# Patient Record
Sex: FEMALE | Race: WHITE | NOT HISPANIC OR LATINO | Employment: OTHER | URBAN - METROPOLITAN AREA
[De-identification: names, ages, dates, MRNs, and addresses within clinical notes are randomized per-mention and may not be internally consistent; named-entity substitution may affect disease eponyms.]

---

## 2023-01-23 ENCOUNTER — APPOINTMENT (EMERGENCY)
Dept: RADIOLOGY | Facility: HOSPITAL | Age: 68
End: 2023-01-23
Attending: EMERGENCY MEDICINE
Payer: MEDICARE

## 2023-01-23 ENCOUNTER — HOSPITAL ENCOUNTER (OUTPATIENT)
Facility: HOSPITAL | Age: 68
Setting detail: OBSERVATION
Discharge: HOME | End: 2023-01-24
Attending: EMERGENCY MEDICINE | Admitting: INTERNAL MEDICINE
Payer: MEDICARE

## 2023-01-23 DIAGNOSIS — N23 RENAL COLIC ON LEFT SIDE: Primary | ICD-10-CM

## 2023-01-23 PROBLEM — N13.9 OBSTRUCTIVE UROPATHY: Status: ACTIVE | Noted: 2023-01-23

## 2023-01-23 PROBLEM — R03.0 ELEVATED BLOOD PRESSURE READING WITHOUT DIAGNOSIS OF HYPERTENSION: Status: ACTIVE | Noted: 2023-01-23

## 2023-01-23 LAB
ALBUMIN SERPL-MCNC: 4.2 G/DL (ref 3.4–5)
ALP SERPL-CCNC: 82 IU/L (ref 35–126)
ALT SERPL-CCNC: 22 IU/L (ref 11–54)
ANION GAP SERPL CALC-SCNC: 8 MEQ/L (ref 3–15)
AST SERPL-CCNC: 21 IU/L (ref 15–41)
ATRIAL RATE: 94
BACTERIA URNS QL MICRO: ABNORMAL /HPF
BASOPHILS # BLD: 0.07 K/UL (ref 0.01–0.1)
BASOPHILS NFR BLD: 0.6 %
BILIRUB SERPL-MCNC: 0.7 MG/DL (ref 0.3–1.2)
BILIRUB UR QL STRIP.AUTO: NEGATIVE MG/DL
BUN SERPL-MCNC: 27 MG/DL (ref 8–20)
CALCIUM SERPL-MCNC: 9.2 MG/DL (ref 8.9–10.3)
CHLORIDE SERPL-SCNC: 104 MEQ/L (ref 98–109)
CLARITY UR REFRACT.AUTO: CLEAR
CO2 SERPL-SCNC: 27 MEQ/L (ref 22–32)
COLOR UR AUTO: COLORLESS
CREAT SERPL-MCNC: 0.7 MG/DL (ref 0.6–1.1)
DIFFERENTIAL METHOD BLD: ABNORMAL
EOSINOPHIL # BLD: 0.17 K/UL (ref 0.04–0.36)
EOSINOPHIL NFR BLD: 1.4 %
ERYTHROCYTE [DISTWIDTH] IN BLOOD BY AUTOMATED COUNT: 13.2 % (ref 11.7–14.4)
GFR SERPL CREATININE-BSD FRML MDRD: >60 ML/MIN/1.73M*2
GLUCOSE SERPL-MCNC: 183 MG/DL (ref 70–99)
GLUCOSE UR STRIP.AUTO-MCNC: ABNORMAL MG/DL
HCT VFR BLDCO AUTO: 42.1 % (ref 35–45)
HGB BLD-MCNC: 13.6 G/DL (ref 11.8–15.7)
HGB UR QL STRIP.AUTO: 3
HYALINE CASTS #/AREA URNS LPF: ABNORMAL /LPF
IMM GRANULOCYTES # BLD AUTO: 0.07 K/UL (ref 0–0.08)
IMM GRANULOCYTES NFR BLD AUTO: 0.6 %
KETONES UR STRIP.AUTO-MCNC: ABNORMAL MG/DL
LEUKOCYTE ESTERASE UR QL STRIP.AUTO: 1
LIPASE SERPL-CCNC: 29 U/L (ref 20–51)
LYMPHOCYTES # BLD: 2.11 K/UL (ref 1.2–3.5)
LYMPHOCYTES NFR BLD: 17.7 %
MCH RBC QN AUTO: 29.4 PG (ref 28–33.2)
MCHC RBC AUTO-ENTMCNC: 32.3 G/DL (ref 32.2–35.5)
MCV RBC AUTO: 90.9 FL (ref 83–98)
MONOCYTES # BLD: 0.52 K/UL (ref 0.28–0.8)
MONOCYTES NFR BLD: 4.4 %
MUCOUS THREADS URNS QL MICRO: ABNORMAL /LPF
NEUTROPHILS # BLD: 8.99 K/UL (ref 1.7–7)
NEUTS SEG NFR BLD: 75.3 %
NITRITE UR QL STRIP.AUTO: NEGATIVE
NRBC BLD-RTO: 0 %
P AXIS: 35
PDW BLD AUTO: 8.9 FL (ref 9.4–12.3)
PH UR STRIP.AUTO: 7 [PH]
PLATELET # BLD AUTO: 261 K/UL (ref 150–369)
POTASSIUM SERPL-SCNC: 3.4 MEQ/L (ref 3.6–5.1)
PR INTERVAL: 158
PROT SERPL-MCNC: 7.3 G/DL (ref 6–8.2)
PROT UR QL STRIP.AUTO: ABNORMAL
QRS DURATION: 86
QT INTERVAL: 372
QTC CALCULATION(BAZETT): 465
R AXIS: -1
RBC # BLD AUTO: 4.63 M/UL (ref 3.93–5.22)
RBC #/AREA URNS HPF: ABNORMAL /HPF
SARS-COV-2 RNA RESP QL NAA+PROBE: NEGATIVE
SODIUM SERPL-SCNC: 139 MEQ/L (ref 136–144)
SP GR UR REFRACT.AUTO: 1.02
SQUAMOUS URNS QL MICRO: ABNORMAL /HPF
T WAVE AXIS: 14
UROBILINOGEN UR STRIP-ACNC: 0.2 EU/DL
VENTRICULAR RATE: 94
WBC # BLD AUTO: 11.93 K/UL (ref 3.8–10.5)
WBC #/AREA URNS HPF: ABNORMAL /HPF

## 2023-01-23 PROCEDURE — 25800000 HC PHARMACY IV SOLUTIONS: Performed by: EMERGENCY MEDICINE

## 2023-01-23 PROCEDURE — 1123F ACP DISCUSS/DSCN MKR DOCD: CPT | Performed by: INTERNAL MEDICINE

## 2023-01-23 PROCEDURE — 36415 COLL VENOUS BLD VENIPUNCTURE: CPT | Performed by: EMERGENCY MEDICINE

## 2023-01-23 PROCEDURE — 81001 URINALYSIS AUTO W/SCOPE: CPT | Performed by: EMERGENCY MEDICINE

## 2023-01-23 PROCEDURE — 85025 COMPLETE CBC W/AUTO DIFF WBC: CPT | Performed by: EMERGENCY MEDICINE

## 2023-01-23 PROCEDURE — 74176 CT ABD & PELVIS W/O CONTRAST: CPT | Mod: ME

## 2023-01-23 PROCEDURE — 63600000 HC DRUGS/DETAIL CODE

## 2023-01-23 PROCEDURE — 93005 ELECTROCARDIOGRAM TRACING: CPT | Performed by: PHYSICIAN ASSISTANT

## 2023-01-23 PROCEDURE — 63600000 HC DRUGS/DETAIL CODE: Performed by: PHYSICIAN ASSISTANT

## 2023-01-23 PROCEDURE — 63700000 HC SELF-ADMINISTRABLE DRUG: Performed by: NURSE PRACTITIONER

## 2023-01-23 PROCEDURE — 96361 HYDRATE IV INFUSION ADD-ON: CPT

## 2023-01-23 PROCEDURE — 96376 TX/PRO/DX INJ SAME DRUG ADON: CPT | Mod: 59 | Performed by: INTERNAL MEDICINE

## 2023-01-23 PROCEDURE — 99223 1ST HOSP IP/OBS HIGH 75: CPT | Mod: FS | Performed by: INTERNAL MEDICINE

## 2023-01-23 PROCEDURE — G0378 HOSPITAL OBSERVATION PER HR: HCPCS

## 2023-01-23 PROCEDURE — 63600000 HC DRUGS/DETAIL CODE: Performed by: EMERGENCY MEDICINE

## 2023-01-23 PROCEDURE — 25800000 HC PHARMACY IV SOLUTIONS: Performed by: PHYSICIAN ASSISTANT

## 2023-01-23 PROCEDURE — 63700000 HC SELF-ADMINISTRABLE DRUG: Performed by: INTERNAL MEDICINE

## 2023-01-23 PROCEDURE — 96376 TX/PRO/DX INJ SAME DRUG ADON: CPT

## 2023-01-23 PROCEDURE — 99284 EMERGENCY DEPT VISIT MOD MDM: CPT | Mod: 25

## 2023-01-23 PROCEDURE — 80053 COMPREHEN METABOLIC PANEL: CPT | Performed by: EMERGENCY MEDICINE

## 2023-01-23 PROCEDURE — U0003 INFECTIOUS AGENT DETECTION BY NUCLEIC ACID (DNA OR RNA); SEVERE ACUTE RESPIRATORY SYNDROME CORONAVIRUS 2 (SARS-COV-2) (CORONAVIRUS DISEASE [COVID-19]), AMPLIFIED PROBE TECHNIQUE, MAKING USE OF HIGH THROUGHPUT TECHNOLOGIES AS DESCRIBED BY CMS-2020-01-R: HCPCS | Performed by: EMERGENCY MEDICINE

## 2023-01-23 PROCEDURE — 83690 ASSAY OF LIPASE: CPT | Performed by: EMERGENCY MEDICINE

## 2023-01-23 PROCEDURE — 96375 TX/PRO/DX INJ NEW DRUG ADDON: CPT

## 2023-01-23 RX ORDER — SODIUM CHLORIDE 9 MG/ML
INJECTION, SOLUTION INTRAVENOUS CONTINUOUS
Status: DISCONTINUED | OUTPATIENT
Start: 2023-01-23 | End: 2023-01-24 | Stop reason: HOSPADM

## 2023-01-23 RX ORDER — ONDANSETRON HYDROCHLORIDE 2 MG/ML
4 INJECTION, SOLUTION INTRAVENOUS EVERY 6 HOURS PRN
Status: DISCONTINUED | OUTPATIENT
Start: 2023-01-23 | End: 2023-01-24 | Stop reason: HOSPADM

## 2023-01-23 RX ORDER — ACETAMINOPHEN 325 MG/1
650 TABLET ORAL EVERY 6 HOURS PRN
Status: DISCONTINUED | OUTPATIENT
Start: 2023-01-23 | End: 2023-01-24 | Stop reason: HOSPADM

## 2023-01-23 RX ORDER — MORPHINE SULFATE 2 MG/ML
1-2 INJECTION, SOLUTION INTRAMUSCULAR; INTRAVENOUS
Status: DISCONTINUED | OUTPATIENT
Start: 2023-01-23 | End: 2023-01-24 | Stop reason: HOSPADM

## 2023-01-23 RX ORDER — KETOROLAC TROMETHAMINE 15 MG/ML
15 INJECTION, SOLUTION INTRAMUSCULAR; INTRAVENOUS ONCE
Status: COMPLETED | OUTPATIENT
Start: 2023-01-23 | End: 2023-01-23

## 2023-01-23 RX ORDER — ONDANSETRON HYDROCHLORIDE 2 MG/ML
INJECTION, SOLUTION INTRAVENOUS
Status: COMPLETED
Start: 2023-01-23 | End: 2023-01-23

## 2023-01-23 RX ORDER — KETOROLAC TROMETHAMINE 15 MG/ML
15 INJECTION, SOLUTION INTRAMUSCULAR; INTRAVENOUS EVERY 6 HOURS PRN
Status: DISCONTINUED | OUTPATIENT
Start: 2023-01-23 | End: 2023-01-24 | Stop reason: HOSPADM

## 2023-01-23 RX ORDER — TAMSULOSIN HYDROCHLORIDE 0.4 MG/1
0.4 CAPSULE ORAL NIGHTLY
Qty: 7 CAPSULE | Refills: 0 | Status: SHIPPED | OUTPATIENT
Start: 2023-01-23 | End: 2023-01-23 | Stop reason: SDUPTHER

## 2023-01-23 RX ORDER — ONDANSETRON 4 MG/1
4 TABLET, FILM COATED ORAL
Qty: 12 TABLET | Refills: 0 | Status: SHIPPED | OUTPATIENT
Start: 2023-01-23 | End: 2023-01-24 | Stop reason: SDUPTHER

## 2023-01-23 RX ORDER — KETOROLAC TROMETHAMINE 10 MG/1
10 TABLET, FILM COATED ORAL EVERY 6 HOURS PRN
Qty: 20 TABLET | Refills: 0 | Status: SHIPPED | OUTPATIENT
Start: 2023-01-23 | End: 2023-01-24 | Stop reason: SDUPTHER

## 2023-01-23 RX ORDER — DEXTROSE 40 %
15-30 GEL (GRAM) ORAL AS NEEDED
Status: DISCONTINUED | OUTPATIENT
Start: 2023-01-23 | End: 2023-01-24 | Stop reason: HOSPADM

## 2023-01-23 RX ORDER — MORPHINE SULFATE 15 MG/1
15 TABLET ORAL EVERY 4 HOURS PRN
Qty: 30 TABLET | Refills: 0 | Status: SHIPPED | OUTPATIENT
Start: 2023-01-23 | End: 2023-01-24 | Stop reason: HOSPADM

## 2023-01-23 RX ORDER — TAMSULOSIN HYDROCHLORIDE 0.4 MG/1
0.4 CAPSULE ORAL DAILY
Status: DISCONTINUED | OUTPATIENT
Start: 2023-01-23 | End: 2023-01-24 | Stop reason: HOSPADM

## 2023-01-23 RX ORDER — TAMSULOSIN HYDROCHLORIDE 0.4 MG/1
0.4 CAPSULE ORAL NIGHTLY
Qty: 7 CAPSULE | Refills: 0 | Status: SHIPPED | OUTPATIENT
Start: 2023-01-23 | End: 2023-01-24 | Stop reason: SDUPTHER

## 2023-01-23 RX ORDER — MORPHINE SULFATE 15 MG/1
15 TABLET ORAL EVERY 4 HOURS PRN
Qty: 30 TABLET | Refills: 0 | Status: SHIPPED | OUTPATIENT
Start: 2023-01-23 | End: 2023-01-23 | Stop reason: SDUPTHER

## 2023-01-23 RX ORDER — IBUPROFEN 200 MG
16-32 TABLET ORAL AS NEEDED
Status: DISCONTINUED | OUTPATIENT
Start: 2023-01-23 | End: 2023-01-24 | Stop reason: HOSPADM

## 2023-01-23 RX ORDER — KETOROLAC TROMETHAMINE 10 MG/1
10 TABLET, FILM COATED ORAL EVERY 6 HOURS PRN
Qty: 20 TABLET | Refills: 0 | Status: SHIPPED | OUTPATIENT
Start: 2023-01-23 | End: 2023-01-23 | Stop reason: SDUPTHER

## 2023-01-23 RX ORDER — MORPHINE SULFATE 4 MG/ML
4 INJECTION, SOLUTION INTRAMUSCULAR; INTRAVENOUS ONCE
Status: COMPLETED | OUTPATIENT
Start: 2023-01-23 | End: 2023-01-23

## 2023-01-23 RX ORDER — ONDANSETRON 4 MG/1
4 TABLET, FILM COATED ORAL
Qty: 12 TABLET | Refills: 0 | Status: SHIPPED | OUTPATIENT
Start: 2023-01-23 | End: 2023-01-23 | Stop reason: SDUPTHER

## 2023-01-23 RX ORDER — MORPHINE SULFATE 2 MG/ML
2 INJECTION, SOLUTION INTRAMUSCULAR; INTRAVENOUS ONCE
Status: COMPLETED | OUTPATIENT
Start: 2023-01-23 | End: 2023-01-23

## 2023-01-23 RX ORDER — DEXTROSE 50 % IN WATER (D50W) INTRAVENOUS SYRINGE
25 AS NEEDED
Status: DISCONTINUED | OUTPATIENT
Start: 2023-01-23 | End: 2023-01-24 | Stop reason: HOSPADM

## 2023-01-23 RX ORDER — ONDANSETRON HYDROCHLORIDE 2 MG/ML
4 INJECTION, SOLUTION INTRAVENOUS ONCE
Status: COMPLETED | OUTPATIENT
Start: 2023-01-23 | End: 2023-01-23

## 2023-01-23 RX ADMIN — MORPHINE SULFATE 2 MG: 2 INJECTION, SOLUTION INTRAMUSCULAR; INTRAVENOUS at 10:49

## 2023-01-23 RX ADMIN — MORPHINE SULFATE 2 MG: 2 INJECTION, SOLUTION INTRAMUSCULAR; INTRAVENOUS at 05:19

## 2023-01-23 RX ADMIN — MORPHINE SULFATE 2 MG: 2 INJECTION, SOLUTION INTRAMUSCULAR; INTRAVENOUS at 07:04

## 2023-01-23 RX ADMIN — KETOROLAC TROMETHAMINE 15 MG: 15 INJECTION, SOLUTION INTRAMUSCULAR; INTRAVENOUS at 07:03

## 2023-01-23 RX ADMIN — ONDANSETRON HYDROCHLORIDE 4 MG: 2 INJECTION, SOLUTION INTRAVENOUS at 15:00

## 2023-01-23 RX ADMIN — KETOROLAC TROMETHAMINE 15 MG: 15 INJECTION, SOLUTION INTRAMUSCULAR; INTRAVENOUS at 05:19

## 2023-01-23 RX ADMIN — KETOROLAC TROMETHAMINE 15 MG: 15 INJECTION, SOLUTION INTRAMUSCULAR; INTRAVENOUS at 12:50

## 2023-01-23 RX ADMIN — ONDANSETRON HYDROCHLORIDE 4 MG: 2 SOLUTION INTRAMUSCULAR; INTRAVENOUS at 15:00

## 2023-01-23 RX ADMIN — ONDANSETRON HYDROCHLORIDE 4 MG: 2 SOLUTION INTRAMUSCULAR; INTRAVENOUS at 05:19

## 2023-01-23 RX ADMIN — SODIUM CHLORIDE: 9 INJECTION, SOLUTION INTRAVENOUS at 10:48

## 2023-01-23 RX ADMIN — MORPHINE SULFATE 4 MG: 4 INJECTION, SOLUTION INTRAMUSCULAR; INTRAVENOUS at 06:16

## 2023-01-23 RX ADMIN — ACETAMINOPHEN 325MG 650 MG: 325 TABLET ORAL at 23:15

## 2023-01-23 RX ADMIN — SODIUM CHLORIDE 1000 ML: 9 INJECTION, SOLUTION INTRAVENOUS at 05:19

## 2023-01-23 RX ADMIN — TAMSULOSIN HYDROCHLORIDE 0.4 MG: 0.4 CAPSULE ORAL at 18:13

## 2023-01-23 RX ADMIN — MORPHINE SULFATE 2 MG: 2 INJECTION, SOLUTION INTRAMUSCULAR; INTRAVENOUS at 14:29

## 2023-01-23 ASSESSMENT — ENCOUNTER SYMPTOMS
NAUSEA: 1
VOMITING: 1
FLANK PAIN: 1
ABDOMINAL PAIN: 1

## 2023-01-23 NOTE — ASSESSMENT & PLAN NOTE
- Patient hypertensive in ED  - Likely at least in part due to pain and anxiety however patient has not seen a medical provider in unclear period of time so unsure if she has hypertension at baseline  - Pain management as needed  - Monitor trends, recommended primary care follow-up to establish preventative care

## 2023-01-23 NOTE — H&P
"   Hospital Medicine Service -  History & Physical        CHIEF COMPLAINT   Left flank pain with nausea and vomiting     HISTORY OF PRESENT ILLNESS      Milagros Hoffman is a 67 y.o. female with no known past medical history who presents with above complaint.  Patient states that she awoke overnight with left-sided flank pain radiating to her left upper quadrant with associated nausea and vomiting.  Denies chest pain, shortness of breath, fevers.  Denies dysuria, frequency, hematuria.  Denies any history of similar.  Patient states she is unsure when she last saw a medical provider.  She does not take any prescription medications.  Patient states she does not get medical care \"because I do not want to.\"  Patient is unaware of any prior history of kidney stones.    In ED patient hypertensive.    ED work-up significant for CT abdomen/pelvis with a left obstructive 5 mm kidney stone.  WBC 11.9.  UA with too numerous to count RBCs and 3+ blood, no current evidence for infection.    In ED patient received IVF hydration, antiemetic, pain medicine.    At time of exam patient sitting upright in bed appears uncomfortable but otherwise in no acute distress.  Her  Gordon is her surrogate decision-maker and she is a full code.    PAST MEDICAL AND SURGICAL HISTORY      History reviewed. No pertinent past medical history.    History reviewed. No pertinent surgical history.    PCP: Pt States, No Pcp    MEDICATIONS      Prior to Admission medications    Medication Sig Start Date End Date Taking? Authorizing Provider   ketorolac (TORADOL) 10 mg tablet Take 1 tablet (10 mg total) by mouth every 6 (six) hours as needed for moderate pain for up to 5 days. 1/23/23 1/28/23 Yes TRAAN Gaspar MD   morphine (MSIR) 15 mg immediate release tablet Take 1 tablet (15 mg total) by mouth every 4 (four) hours as needed for severe pain for up to 10 days. 1/23/23 2/2/23 Yes TARAN Gaspar MD   ondansetron (ZOFRAN) 4 mg tablet Take 1 tablet " (4 mg total) by mouth every 6 (six) hours for 7 days. 1/23/23 1/30/23 Yes TARAN Gaspar MD   tamsulosin (FLOMAX) 0.4 mg capsule Take 1 capsule (0.4 mg total) by mouth nightly. After dinner 1/23/23  Yes TARAN Gaspar MD   ketorolac (TORADOL) 10 mg tablet Take 1 tablet (10 mg total) by mouth every 6 (six) hours as needed for moderate pain for up to 5 days. 1/23/23 1/23/23  TARAN Gaspar MD   morphine (MSIR) 15 mg immediate release tablet Take 1 tablet (15 mg total) by mouth every 4 (four) hours as needed for severe pain for up to 10 days. 1/23/23 1/23/23  TARAN Gaspar MD   ondansetron (ZOFRAN) 4 mg tablet Take 1 tablet (4 mg total) by mouth every 6 (six) hours for 7 days. 1/23/23 1/23/23  TARAN Gaspar MD   tamsulosin (FLOMAX) 0.4 mg capsule Take 1 capsule (0.4 mg total) by mouth nightly. After dinner 1/23/23 1/23/23  TARAN Gaspar MD       ALLERGIES      Patient has no known allergies.    FAMILY HISTORY      History reviewed. No pertinent family history.    SOCIAL HISTORY      Social History     Socioeconomic History   • Marital status:      Spouse name: None   • Number of children: None   • Years of education: None   • Highest education level: None   Tobacco Use   • Smoking status: Never   • Smokeless tobacco: Never   Substance and Sexual Activity   • Alcohol use: Never   • Drug use: Never     Social Determinants of Health     Food Insecurity: No Food Insecurity   • Worried About Running Out of Food in the Last Year: Never true   • Ran Out of Food in the Last Year: Never true       REVIEW OF SYSTEMS      All other systems reviewed and negative except as noted in HPI    PHYSICAL EXAMINATION      Temp:  [37.1 °C (98.7 °F)] 37.1 °C (98.7 °F)  Heart Rate:  [79-86] 86  Resp:  [17-22] 17  BP: (166-186)/() 166/77  Body mass index is 28.01 kg/m².    Physical Exam  Constitutional:       Appearance: She is not toxic-appearing or diaphoretic.      Comments: Appears uncomfortable   HENT:       "Head: Normocephalic and atraumatic.   Eyes:      General: No scleral icterus.     Extraocular Movements: Extraocular movements intact.      Conjunctiva/sclera: Conjunctivae normal.      Pupils: Pupils are equal, round, and reactive to light.   Cardiovascular:      Rate and Rhythm: Normal rate and regular rhythm.      Heart sounds: Normal heart sounds.   Pulmonary:      Effort: Pulmonary effort is normal. No respiratory distress.      Breath sounds: Normal breath sounds. No stridor. No wheezing, rhonchi or rales.   Abdominal:      General: Bowel sounds are normal. There is no distension.      Palpations: Abdomen is soft.      Tenderness: There is no right CVA tenderness, left CVA tenderness or guarding.      Comments: Mild TTP of LUQ abdomen   Musculoskeletal:         General: No swelling. Normal range of motion.      Cervical back: Normal range of motion.   Skin:     General: Skin is warm and dry.      Capillary Refill: Capillary refill takes less than 2 seconds.   Neurological:      Mental Status: She is alert and oriented to person, place, and time.   Psychiatric:         Mood and Affect: Mood normal.         Behavior: Behavior normal.         LABS / IMAGING / EKG        Labs  Labs reviewed . See A/P for plan regarding pertinent labs.     Imaging  I have independently reviewed the pertinent imaging from the last 24 hrs. and Significant findings include:     CT ABDOMEN / PELVIS:  \"IMPRESSION:     1.  Left-sided obstructive uropathy.   2.  Additional chronic and incidental findings as described above.\"    No results found for: COVID19    ECG/Telemetry  N/A    ASSESSMENT AND PLAN           * Obstructive uropathy  Assessment & Plan  - Admit [facility]  - Patient presented to ED for acute onset of left-sided flank pain that began overnight with nausea and vomiting  - CT abdomen/pelvis consistent with left-sided obstructive uropathy due to 5 mm proximal left ureteral calculus, mild left sided perinephritic stranding  - " Mild leukocytosis 11.9 without fever  - UA does not appear infected at this time  - Aggressive IVF hydration  - N.p.o. pending urology evaluation  - Pain control as needed    Elevated blood pressure reading without diagnosis of hypertension  Assessment & Plan  - Patient hypertensive in ED  - Likely at least in part due to pain and anxiety however patient has not seen a medical provider in unclear period of time so unsure if she has hypertension at baseline  - Pain management as needed  - Monitor trends       VTE Assessment: Padua VTE Score: 0  VTE Prophylaxis: Current anticoagulants:    •None      Code Status: Full Code  Palliative Care Screening Score: 1   Discussed advanced care planning.   Estimated Discharge Date: 1/25/2023  Disposition Planning: Discharge home pending hospital course     SHAWN Leung  1/23/2023

## 2023-01-23 NOTE — CONSULTS
"Urology Consultation    Milagros Hoffman is a 67 y.o. female who was admitted for Renal colic [N23]  Renal colic on left side [N23]. Patient seen for 5 mm left proximal ureteral calculus, renal colic.  Patient seen at request of Dr. Mulligan.     Patient is a 67 y.o. female with no known past medical history who presented to the ED with left flank pain with associated nausea and vomiting. CT a/p showed a 5 mm proximal ureteral calculus with associated mild left hydroureteronephrosis. WBC 11.93, creatinine 0.7. UA bland with +1 leuks, 0-3 WBCs, rare bacteria, TNTC RBCs. Afebrile and hemodynamically stable. Patient resting comfortably in bed on exam. Reports vomiting multiple times overnight but nothing since being admitted. Denies nausea, fever, chills at the time of exam. Denies a history of kidney stones, UTIs, hematuria, and urinary retention. Urinating without difficulty and without pain. Reports pain improving since receiving pain medication, approximately a \"3 out of 10\" per patient report.       Subjective   Left sided flank pain     Severity: mild   Onset quality: sudden   Duration: 1 day   Timing: constant   Progression: improving   Chronicity: acute   Relieved by: pain medication, hydration   Worsened by: ureteral calculus   Associated symptoms: nausea, vomiting       Medical History: History reviewed. No pertinent past medical history.    Surgical History: History reviewed. No pertinent surgical history.    Social History: never smoker, denies ETOH and drug use     Family History: History reviewed. No pertinent family history.    Allergies: Patient has no known allergies.    Current Facility-Administered Medications   Medication Dose Route Frequency Provider Last Rate Last Admin   • glucose chewable tablet 16-32 g of dextrose  16-32 g of dextrose oral PRN Isela Lucio PA C        Or   • dextrose 40 % oral gel 15-30 g of dextrose  15-30 g of dextrose oral PRN Isela Lucio PA C        Or "   • glucagon (GLUCAGEN) injection 1 mg  1 mg intramuscular PRN Isela Lucio PA C        Or   • dextrose 50 % in water (D50) injection 12.5 g  25 mL intravenous PRN Isela Lucio PA C       • ketorolac (TORADOL) injection 15 mg  15 mg intravenous q6h PRN Isela Lucio PA C   15 mg at 01/23/23 1250   • morphine injection 1-2 mg  1-2 mg intravenous q3h PRN Isela Lucio PA C   2 mg at 01/23/23 1429   • ondansetron (ZOFRAN) injection 4 mg  4 mg intravenous q6h PRN Isela Lucio PA C   4 mg at 01/23/23 1500   • sodium chloride 0.9 % infusion   intravenous Continuous Isela Lucio PA C 100 mL/hr at 01/23/23 1048 New Bag at 01/23/23 1048     Review of Systems  Pertinent items are noted in HPI.        Labs  CBC Results       01/23/23     0515    WBC 11.93    RBC 4.63    HGB 13.6    HCT 42.1    MCV 90.9    MCH 29.4    MCHC 32.3          BMP Results       01/23/23     0515        K 3.4    Cl 104    CO2 27    Glucose 183    BUN 27    Creatinine 0.7    Calcium 9.2    Anion Gap 8    EGFR >60.0         Comment for K at 0515 on 01/23/23: Results obtained on plasma. Plasma Potassium values may be up to 0.4 mEQ/L less than serum values. The differences may be greater for patients with high platelet or white cell counts.      PT/PTT Results    No lab values to display.     UA Results       01/23/23     0707    Color Colorless    Clarity Clear    Glucose Trace    Bilirubin Negative    Ketones Trace    Sp Grav 1.017    Blood +3    Ph 7.0    Protein Trace    Urobilinogen 0.2    Nitrite Negative    Leuk Est +1    WBC 0 TO 3    RBC Too Numerous To Count    Bacteria Rare         Comment for Ketones at 0707 on 01/23/23: Free sulfhydryl drugs such as Mesna, Capoten, and Acetylcysteine (Mucomyst) may cause false positive ketonuria.    Comment for Blood at 0707 on 01/23/23: The sensitivity of the occult blood test is equivalent to approximately 4 intact RBC/HPF.    Comment for  "Protein at 0707 on 01/23/23: Trace False Positive Protein can be seen with alkaline or highly buffered urines or urine with high specific gravity. Suggest clinical correlation.      Lactate Results    No lab values to display.       No results found for: PSA    Imaging  CT a/p w/o CNTR 1/23/23    Heart normal in size.  No pleural or pericardial effusion.  Mild hypoventilatory   changes noted at the lung bases.  Degenerative changes of the spine, hips and   pubic symphysis noted.  No calcified gallstones or gross pericholecystic   inflammatory changes.  No biliary ductal dilatation.  A 3 cm cyst is noted   within the right lobe of the liver.  Unenhanced pancreas, spleen, adrenal glands   and right kidney grossly unremarkable.  Mild left-sided hydroureteronephrosis   noted to the level of a obstructing proximal left ureteral calculus measuring 5   mm in diameter.  There is mild left-sided perinephric stranding.  No additional   urinary tract calculi noted.  There is no right-sided hydroureteronephrosis.   Urinary bladder grossly unremarkable.  No gross pelvic masses noted.  No   ascites.  No abdominal aortic aneurysm.  No abdominal lymphadenopathy.  No bowel   obstruction or pneumoperitoneum.  No focal inflammatory changes of bowel noted.   No pelvic lymphadenopathy.    Impression:     IMPRESSION:     1.  Left-sided obstructive uropathy.   2.  Additional chronic and incidental findings as described above.       Physicial Exam  Visit Vitals  /69 (BP Location: Right upper arm, Patient Position: Lying)   Pulse 99   Temp 37.1 °C (98.7 °F) (Oral)   Resp 18   Ht 1.499 m (4' 11\")   Wt 62.9 kg (138 lb 10.7 oz)   SpO2 100%   BMI 28.01 kg/m²     General appearance: alert, appears stated age, cooperative and no distress  Head: normocephalic, without obvious abnormality, atraumatic  Neck: supple, symmetrical, trachea midline  Back: mild left CVA tenderness to palpation, no right sided tenderness  Chest wall: breathing non " labored  Abdomen: soft, non distended, mild LUQ tenderness to with movement none with palpation   Female genitalia: normal external genitalia, no erythema, no discharge  Rectal: deferred  Skin: temperature normal        Assessment   - 5 mm left ureteral calculus with associated mild left hydroureteronephrosis  - UA without evidence of infection       Plan    - Monitor labs/UOP  - Pain medication prn  - Supportive care per primary service  - Afebrile and hemodynamically stable. No need for emergent surgical intervention at this time. Continue with pain management and expulsive therapy. Pain medication, IV hydration, Flomax, strain all urine. NPO at midnight. Can continue expulsive therapy at home if pain able to be controlled.   - Outpatient follow up with Urology   - Following     SHAWN Hamilton  1/23/2023  4:36 PM

## 2023-01-23 NOTE — PLAN OF CARE
Problem: Adult Inpatient Plan of Care  Goal: Plan of Care Review  Outcome: Progressing  Flowsheets (Taken 1/23/2023 5799)  Progress: no change  Plan of Care Reviewed With: patient  Outcome Summary: Pt recieved from ED, aaox3. Reports mild pain of 3 in L flank area. IVF infusing as ordered.  at bedside. Call bell in reach, safety precautions in place.   Plan of Care Review  Plan of Care Reviewed With: patient  Progress: no change  Outcome Summary: Pt recieved from ED, aaox3. Reports mild pain of 3 in L flank area. IVF infusing as ordered.  at bedside. Call bell in reach, safety precautions in place.

## 2023-01-23 NOTE — ASSESSMENT & PLAN NOTE
Left Hydro ureter nephrosis with proximal 5 mm left sided calculus noted  -Continue with pain control (has not needed in >12 hours), IV fluids.  Initiate tamsulosin until urology f/u  -urology consult appreciated, renal u.s and KUB show stone has either passed or now is in uterovesicular junction  -Given clinical improvement, urology recommends outpatient follow-up with them and encouraging oral intake.  She will strain her urine  -Given her rising white count and low-grade temperature, she was given ceftriaxone x1 with Bactrim x3 days pending urine culture

## 2023-01-23 NOTE — ED PROVIDER NOTES
Emergency Medicine Note  HPI   HISTORY OF PRESENT ILLNESS     Patient is a 67-year-old female presents the ER with severe left flank pain.  Patient states went to bed usual state of health no complaints.  Patient awakened at 2 AM with sudden onset of severe left flank pain.  Pain not radiating constant.  Associated with multiple episodes of nausea and vomiting.  Patient stated that she took an aspirin prior to arrival with no improvement.  No previous history of same.  No history of kidney stones.      History provided by:  Patient   used: No    Abdominal Pain  Pain location:  L flank  Pain quality: cramping, pressure and squeezing    Pain radiates to:  Does not radiate  Pain severity:  Severe  Onset quality:  Gradual  Duration:  3 hours  Timing:  Constant  Progression:  Unchanged  Chronicity:  New  Relieved by:  Nothing  Worsened by:  Nothing  Ineffective treatments:  NSAIDs  Associated symptoms: nausea and vomiting          Patient History   PAST HISTORY     Reviewed from Nursing Triage:  Allergies  Meds       History reviewed. No pertinent past medical history.    History reviewed. No pertinent surgical history.    History reviewed. No pertinent family history.    Social History     Tobacco Use   • Smoking status: Never   • Smokeless tobacco: Never   Substance Use Topics   • Alcohol use: Never   • Drug use: Never         Review of Systems   REVIEW OF SYSTEMS     Review of Systems   Gastrointestinal: Positive for abdominal pain, nausea and vomiting.         VITALS     ED Vitals    Date/Time Temp Pulse Resp BP SpO2 [facility]   01/23/23 1550 -- 99 16 155/76 100 %    01/23/23 1425 -- -- 16 152/75 99 %    01/23/23 1200 -- -- -- 115/57 94 %    01/23/23 1050 -- 86 17 166/77 99 %    01/23/23 0724 -- 79 19 186/82 99 %    01/23/23 0457 37.1 °C (98.7 °F) 82 22 172/100 100 % AC        Pulse Ox %: 100 % (01/23/23 0519)  Pulse Ox Interpretation: Normal (01/23/23 0519)  Heart Rate: 82 (01/23/23  0519)  Rhythm Strip Interpretation: Normal Sinus Rhythm (01/23/23 0519)     Physical Exam   PHYSICAL EXAM     Physical Exam  Vitals and nursing note reviewed.   Constitutional:       General: She is in acute distress.      Appearance: She is well-developed and well-nourished. She is ill-appearing.   HENT:      Head: Normocephalic and atraumatic.   Eyes:      Extraocular Movements: EOM normal.      Conjunctiva/sclera: Conjunctivae normal.   Neck:      Trachea: No tracheal deviation.   Cardiovascular:      Rate and Rhythm: Normal rate and regular rhythm.      Heart sounds: Normal heart sounds. No murmur heard.  Pulmonary:      Effort: Pulmonary effort is normal. No respiratory distress.      Breath sounds: Normal breath sounds.   Abdominal:      General: Abdomen is flat.      Palpations: Abdomen is soft.      Tenderness: There is abdominal tenderness in the left upper quadrant. There is left CVA tenderness. There is no guarding or rebound.   Musculoskeletal:         General: No edema. Normal range of motion.      Cervical back: Neck supple.   Skin:     General: Skin is warm and dry.   Neurological:      Mental Status: She is alert.      GCS: GCS eye subscore is 4. GCS verbal subscore is 5. GCS motor subscore is 6.      Motor: Motor strength is normal.   Psychiatric:         Mood and Affect: Mood and affect normal.           PROCEDURES     Procedures     DATA     Results     Procedure Component Value Units Date/Time    SARS-CoV-2 (COVID-19), PCR Nasopharynx [[id number]]  (Normal) Collected: 01/23/23 1052    Specimen: Nasopharyngeal Swab from Nasopharynx Updated: 01/23/23 1130    Narrative:      The following orders were created for panel order SARS-CoV-2 (COVID-19), PCR Nasopharynx.  Procedure                               Abnormality         Status                     ---------                               -----------         ------                     SARS-CoV-2 (COVID-19), P...[[id number]]  Normal              Final  result                 Please view results for these tests on the individual orders.    SARS-CoV-2 (COVID-19), PCR Nasopharynx [[id number]]  (Normal) Collected: 01/23/23 1052    Specimen: Nasopharyngeal Swab from Nasopharynx Updated: 01/23/23 1130     SARS-CoV-2 (COVID-19) Negative    Narrative:      Testing performed using real-time PCR for detection of COVID-19. EUA approved validation studies performed on site.     UA with reflex culture [[id number]]  (Abnormal) Collected: 01/23/23 0707    Specimen: Urine, Clean Catch Updated: 01/23/23 0802    Narrative:      The following orders were created for panel order UA with reflex culture.  Procedure                               Abnormality         Status                     ---------                               -----------         ------                     UA Reflex to Culture (Ma...[[id number]]  Abnormal            Final result               UA Microscopic[[id number]]               Abnormal            Final result                 Please view results for these tests on the individual orders.    UA Microscopic [[id number]]  (Abnormal) Collected: 01/23/23 0707    Specimen: Urine, Clean Catch Updated: 01/23/23 0802     RBC, Urine Too Numerous To Count /HPF      WBC, Urine 0 TO 3 /HPF      Squamous Epithelial Rare /hpf      Hyaline Cast None Seen /lpf      Bacteria, Urine Rare /HPF      MUCUSUA Rare /LPF     UA Reflex to Culture (Macroscopic) [[id number]]  (Abnormal) Collected: 01/23/23 0707    Specimen: Urine, Clean Catch Updated: 01/23/23 0715     Color, Urine Colorless     Clarity, Urine Clear     Specific Gravity, Urine 1.017     pH, Urine 7.0     Leukocyte Esterase +1     Nitrite, Urine Negative     Protein, Urine Trace     Comment: Trace False Positive Protein can be seen with alkaline or highly buffered urines or urine with high specific gravity. Suggest clinical correlation.        Glucose, Urine Trace mg/dL      Ketones, Urine Trace mg/dL      Comment: Free  sulfhydryl drugs such as Mesna, Capoten, and Acetylcysteine (Mucomyst) may cause false positive ketonuria.        Urobilinogen, Urine 0.2 EU/dL      Bilirubin, Urine Negative mg/dL      Blood, Urine +3     Comment: The sensitivity of the occult blood test is equivalent to approximately 4 intact RBC/HPF.       Comprehensive metabolic panel [[id number]]  (Abnormal) Collected: 01/23/23 0515    Specimen: Blood, Venous Updated: 01/23/23 0545     Sodium 139 mEQ/L      Potassium 3.4 mEQ/L      Comment: Results obtained on plasma. Plasma Potassium values may be up to 0.4 mEQ/L less than serum values. The differences may be greater for patients with high platelet or white cell counts.        Chloride 104 mEQ/L      CO2 27 mEQ/L      BUN 27 mg/dL      Creatinine 0.7 mg/dL      Glucose 183 mg/dL      Calcium 9.2 mg/dL      AST (SGOT) 21 IU/L      ALT (SGPT) 22 IU/L      Alkaline Phosphatase 82 IU/L      Total Protein 7.3 g/dL      Comment: Test performed on plasma which typically contains approximately 0.4 g/dL more protein than serum.        Albumin 4.2 g/dL      Bilirubin, Total 0.7 mg/dL      eGFR >60.0 mL/min/1.73m*2      Anion Gap 8 mEQ/L     Lipase [[id number]]  (Normal) Collected: 01/23/23 0515    Specimen: Blood, Venous Updated: 01/23/23 0545     Lipase 29 U/L     CBC and differential [[id number]]  (Abnormal) Collected: 01/23/23 0515    Specimen: Blood, Venous Updated: 01/23/23 0520     WBC 11.93 K/uL      RBC 4.63 M/uL      Hemoglobin 13.6 g/dL      Hematocrit 42.1 %      MCV 90.9 fL      MCH 29.4 pg      MCHC 32.3 g/dL      RDW 13.2 %      Platelets 261 K/uL      MPV 8.9 fL      Differential Type Auto     nRBC 0.0 %      Immature Granulocytes 0.6 %      Neutrophils 75.3 %      Lymphocytes 17.7 %      Monocytes 4.4 %      Eosinophils 1.4 %      Basophils 0.6 %      Immature Granulocytes, Absolute 0.07 K/uL      Neutrophils, Absolute 8.99 K/uL      Lymphocytes, Absolute 2.11 K/uL      Monocytes, Absolute 0.52 K/uL       Eosinophils, Absolute 0.17 K/uL      Basophils, Absolute 0.07 K/uL           Imaging Results          CT ABDOMEN PELVIS WITHOUT IV CONTRAST (Final result)  Result time 01/23/23 08:20:40    Final result                 Impression:    IMPRESSION:    1.  Left-sided obstructive uropathy.  2.  Additional chronic and incidental findings as described above.             Narrative:      CLINICAL HISTORY: Flank pain, kidney stone suspected  left   .    Comparison: None available.    COMMENT: A CT examination of the abdomen and pelvis was performed without  intravenous contrast.    CT DOSE:  One or more dose reduction techniques (e.g. automated exposure  control, adjustment of the mA and/or kV according to patient size, use of  iterative reconstruction technique) utilized for this examination.    Intravenous contrast: Not administered, limiting evaluation of the solid organs  and vasculature.    Oral contrast: Not administered, limiting evaluation of the bowel.    Motion artifact limits evaluation.    Heart normal in size.  No pleural or pericardial effusion.  Mild hypoventilatory  changes noted at the lung bases.  Degenerative changes of the spine, hips and  pubic symphysis noted.  No calcified gallstones or gross pericholecystic  inflammatory changes.  No biliary ductal dilatation.  A 3 cm cyst is noted  within the right lobe of the liver.  Unenhanced pancreas, spleen, adrenal glands  and right kidney grossly unremarkable.  Mild left-sided hydroureteronephrosis  noted to the level of a obstructing proximal left ureteral calculus measuring 5  mm in diameter.  There is mild left-sided perinephric stranding.  No additional  urinary tract calculi noted.  There is no right-sided hydroureteronephrosis.  Urinary bladder grossly unremarkable.  No gross pelvic masses noted.  No  ascites.  No abdominal aortic aneurysm.  No abdominal lymphadenopathy.  No bowel  obstruction or pneumoperitoneum.  No focal inflammatory changes of bowel  noted.  No pelvic lymphadenopathy.                    ED Interpretation        [facility]  Teleradiology Cases  This communication is confidential. The information contained herein is protected from unauthorized use by privilege or law. Copying, distribution, disclosure, or other use of this information is prohibited. You are required to destroy this communication if you have received it in error.      Patient Name: NOEMI PEREZ  Exam(s): a/p stone CT  MR#: [id number]    History: FLANK PAIN    Preliminary Impression:    5 stone in the proximal left ureter, causing left hydronephrosis. No stone in either kidney.    Interpreted by: Alaina Shaffer MD, Jan 23, 2023 06:19 AM    NOEMI PEREZ [id number], Jan 23, 2023                                ECG 12 lead   Final Result          Scoring tools                                  ED Course & MDM   MDM / ED COURSE / CLINICAL IMPRESSION / DISPO     Medical Decision Making  67-year-old female presents with acute onset of left flank pain.  New onset of symptoms for this patient no previous history of same.  Patient in moderate severe distress was having significant pain and nausea vomiting on arrival to ER.  Patient was medicated for pain.    Work-up in ER significant findings of a 5 mm stone in proximal left ureter with hydronephrosis.  Consistent with patient's history of present illness and pain presentation.  Patient is having recurrent pain in the ED unrelieved with initial meds redosing meds to improve patient's status.    Kidney stone instructions discussed with patient and  at bedside.  Plan for treatment and care discussed as well.    Patient will need to be evaluated by oncoming physician to determine patient's disposition after repeat pain medications.  Patient is certainly stable for discharge home but is having significant pain which may prevent her from being discharged secondary to needing more intensive pain management.    Renal colic  on left side: acute illness or injury  Amount and/or Complexity of Data Reviewed  Labs: ordered.  Radiology: ordered and independent interpretation performed.      Risk  Prescription drug management.  Decision regarding hospitalization.          ED Course as of 01/23/23 1904 Mon Jan 23, 2023   0642 Patient's pain is improving but still significantly present.  Discussed findings of CAT scan with patient and patient's  at bedside.  Kidney stone instructions were provided to patient and  at bedside. [RL]   1005 Pt is feeling better but feels she needs to be admitted for futher pain control. Pt does not have a h/o kidney stones. Call placed to [facility] for admission; urology consulted [EW]   1006 10:25 AM  D/w Connie; accepts pt for [facility] admission; requesting urology be contacted [EW]   1109 11:09 AM  D/w Samina FRIEDMAN) from urology; pt to be seen in consultation [EW]      ED Course User Index  [EW] Henrry Bay MD  [RL] TARAN Gaspar MD     Clinical Impression      Renal colic on left side - uncontrollable pain     _________________     ED Disposition   Admit / Observation                   TARAN Gaspar MD  01/23/23 0645       TARAN Gaspar MD  01/23/23 1904

## 2023-01-24 ENCOUNTER — APPOINTMENT (OUTPATIENT)
Dept: RADIOLOGY | Facility: HOSPITAL | Age: 68
Setting detail: OBSERVATION
End: 2023-01-24
Attending: PHYSICIAN ASSISTANT
Payer: MEDICARE

## 2023-01-24 VITALS
HEART RATE: 91 BPM | HEIGHT: 59 IN | WEIGHT: 138.67 LBS | SYSTOLIC BLOOD PRESSURE: 150 MMHG | OXYGEN SATURATION: 100 % | RESPIRATION RATE: 16 BRPM | TEMPERATURE: 99.7 F | BODY MASS INDEX: 27.96 KG/M2 | DIASTOLIC BLOOD PRESSURE: 76 MMHG

## 2023-01-24 LAB
ANION GAP SERPL CALC-SCNC: 9 MEQ/L (ref 3–15)
BUN SERPL-MCNC: 19 MG/DL (ref 8–20)
CALCIUM SERPL-MCNC: 8.2 MG/DL (ref 8.9–10.3)
CHLORIDE SERPL-SCNC: 106 MEQ/L (ref 98–109)
CHOLEST SERPL-MCNC: 137 MG/DL
CO2 SERPL-SCNC: 21 MEQ/L (ref 22–32)
CREAT SERPL-MCNC: 1 MG/DL (ref 0.6–1.1)
ERYTHROCYTE [DISTWIDTH] IN BLOOD BY AUTOMATED COUNT: 13.3 % (ref 11.7–14.4)
EST. AVERAGE GLUCOSE BLD GHB EST-MCNC: 100 MG/DL
GFR SERPL CREATININE-BSD FRML MDRD: 55.1 ML/MIN/1.73M*2
GLUCOSE SERPL-MCNC: 137 MG/DL (ref 70–99)
HBA1C MFR BLD HPLC: 5.1 %
HCT VFR BLDCO AUTO: 35.5 % (ref 35–45)
HDLC SERPL-MCNC: 46 MG/DL
HDLC SERPL: 3 {RATIO}
HGB BLD-MCNC: 11.7 G/DL (ref 11.8–15.7)
LDLC SERPL CALC-MCNC: 81 MG/DL
MCH RBC QN AUTO: 30 PG (ref 28–33.2)
MCHC RBC AUTO-ENTMCNC: 33 G/DL (ref 32.2–35.5)
MCV RBC AUTO: 91 FL (ref 83–98)
NONHDLC SERPL-MCNC: 91 MG/DL
PDW BLD AUTO: 9.1 FL (ref 9.4–12.3)
PLATELET # BLD AUTO: 203 K/UL (ref 150–369)
POTASSIUM SERPL-SCNC: 3.6 MEQ/L (ref 3.6–5.1)
RBC # BLD AUTO: 3.9 M/UL (ref 3.93–5.22)
SODIUM SERPL-SCNC: 136 MEQ/L (ref 136–144)
TRIGL SERPL-MCNC: 50 MG/DL (ref 30–149)
WBC # BLD AUTO: 17.37 K/UL (ref 3.8–10.5)

## 2023-01-24 PROCEDURE — 36415 COLL VENOUS BLD VENIPUNCTURE: CPT | Performed by: PHYSICIAN ASSISTANT

## 2023-01-24 PROCEDURE — 74018 RADEX ABDOMEN 1 VIEW: CPT

## 2023-01-24 PROCEDURE — G0378 HOSPITAL OBSERVATION PER HR: HCPCS

## 2023-01-24 PROCEDURE — 87086 URINE CULTURE/COLONY COUNT: CPT | Performed by: PHYSICIAN ASSISTANT

## 2023-01-24 PROCEDURE — 80048 BASIC METABOLIC PNL TOTAL CA: CPT | Performed by: PHYSICIAN ASSISTANT

## 2023-01-24 PROCEDURE — 83036 HEMOGLOBIN GLYCOSYLATED A1C: CPT | Performed by: PHYSICIAN ASSISTANT

## 2023-01-24 PROCEDURE — 25800000 HC PHARMACY IV SOLUTIONS: Performed by: INTERNAL MEDICINE

## 2023-01-24 PROCEDURE — 80061 LIPID PANEL: CPT | Performed by: PHYSICIAN ASSISTANT

## 2023-01-24 PROCEDURE — 99238 HOSP IP/OBS DSCHRG MGMT 30/<: CPT | Performed by: INTERNAL MEDICINE

## 2023-01-24 PROCEDURE — 76775 US EXAM ABDO BACK WALL LIM: CPT

## 2023-01-24 PROCEDURE — 63700000 HC SELF-ADMINISTRABLE DRUG: Performed by: INTERNAL MEDICINE

## 2023-01-24 PROCEDURE — 96365 THER/PROPH/DIAG IV INF INIT: CPT | Performed by: INTERNAL MEDICINE

## 2023-01-24 PROCEDURE — 85027 COMPLETE CBC AUTOMATED: CPT | Performed by: PHYSICIAN ASSISTANT

## 2023-01-24 PROCEDURE — 63600000 HC DRUGS/DETAIL CODE: Performed by: INTERNAL MEDICINE

## 2023-01-24 RX ORDER — TAMSULOSIN HYDROCHLORIDE 0.4 MG/1
0.4 CAPSULE ORAL NIGHTLY
Qty: 30 CAPSULE | Refills: 0 | Status: SHIPPED | OUTPATIENT
Start: 2023-01-24 | End: 2024-11-25 | Stop reason: ALTCHOICE

## 2023-01-24 RX ORDER — ACETAMINOPHEN 325 MG/1
650 TABLET ORAL EVERY 6 HOURS PRN
Start: 2023-01-24 | End: 2023-02-23

## 2023-01-24 RX ORDER — KETOROLAC TROMETHAMINE 10 MG/1
10 TABLET, FILM COATED ORAL EVERY 6 HOURS PRN
Qty: 20 TABLET | Refills: 0 | Status: SHIPPED | OUTPATIENT
Start: 2023-01-24 | End: 2023-01-29

## 2023-01-24 RX ORDER — SULFAMETHOXAZOLE AND TRIMETHOPRIM 800; 160 MG/1; MG/1
1 TABLET ORAL 2 TIMES DAILY
Qty: 6 TABLET | Refills: 0 | Status: SHIPPED | OUTPATIENT
Start: 2023-01-25 | End: 2023-01-28

## 2023-01-24 RX ORDER — ONDANSETRON 4 MG/1
4 TABLET, FILM COATED ORAL
Qty: 12 TABLET | Refills: 0 | Status: SHIPPED | OUTPATIENT
Start: 2023-01-24 | End: 2024-11-25 | Stop reason: ALTCHOICE

## 2023-01-24 RX ADMIN — TAMSULOSIN HYDROCHLORIDE 0.4 MG: 0.4 CAPSULE ORAL at 08:04

## 2023-01-24 RX ADMIN — CEFTRIAXONE SODIUM 1 G: 1 INJECTION, POWDER, FOR SOLUTION INTRAMUSCULAR; INTRAVENOUS at 08:04

## 2023-01-24 NOTE — PROGRESS NOTES
Date/Time Temp Heart Rate (from SpO2) Pulse Resp BP Patient Position SpO2 Oxygen Therapy   01/24/23 0755 37.6 (99.7) -- 91 16 150/76 Abnormal  Lying 100 None (Room air)     WBC: 17.37  Hgb: 203  BUN: 19  Creat: 1.0  No acute events overnight   Temperature of 100.1 F overnight, afebrile this am  Has not required pain medication since yesterday afternoon   Denies nausea, vomiting, fever, chills  Abdomen soft, non tender, non distended  No CVA tenderness to palpation   Urinating without difficulty per patient report      Assessment      - 5 mm left ureteral calculus with associated mild left hydroureteronephrosis  - UA without evidence of infection             Plan       - Monitor labs/UOP  - Pain medication prn  - Supportive care per primary service  - WBC elevated this am and low grade temp overnight. Check urine culture  - Pain well controlled and hemodynamically stable. No need for emergent surgical intervention at this time. Check KUB and JAZMIN. May resume diet. Continue with pain management and expulsive therapy. Pain medication, IV hydration, Flomax, strain all urine. NPO at midnight if staying overnight  - Outpatient follow up with Urology   - Following

## 2023-01-24 NOTE — DISCHARGE INSTRUCTIONS
You came to the hospital with left side/flank pain and were found to have a kidney stone causing a blockage in your urinary system near the kidney.  You were evaluated by the urologist with recommendations for fluids, pain medications and a medicine called Flomax to help the stone move out of the body in the urine.  Your symptoms have gone away and you can continue to manage at home.  Monitor for return of symptoms, fevers/chills or new urinary symptoms.      You can use pain medications as needed and should stay well hydrated, drinking plenty of water and fluid each day.  You should use Flomax daily for now and strain your urine to try to collect the kidney stone to bring to the urology appointment.  You should also take 3 days of an antibiotic called Bactrim from 1/25-1/27.      Please find a PCP for follow up and have repeat lab work (BMP, CBC) in the next 2 weeks.

## 2023-01-24 NOTE — DISCHARGE SUMMARY
Hospital Medicine Service -  Inpatient Discharge Summary        BRIEF OVERVIEW   Admitting Provider: Pb Mulligan MD  Attending Provider:Pb Mulligan   PCP: Pt States, No Pcp [phone]    Admission Date: 1/23/2023  Discharge Date: 1/24/2023     DISCHARGE DIAGNOSES      Primary Discharge Diagnosis  Obstructive uropathy    Secondary Discharge Diagnoses  Active Hospital Problems    Diagnosis Date Noted   • Obstructive uropathy 01/23/2023     Priority: High   • Elevated blood pressure reading without diagnosis of hypertension 01/23/2023      Resolved Hospital Problems   No resolved problems to display.       Problem List on Day of Discharge  * Obstructive uropathy  Assessment & Plan  Left Hydro ureter nephrosis with proximal 5 mm left sided calculus noted  -Continue with pain control (has not needed in >12 hours), IV fluids.  Initiate tamsulosin until urology f/u  -urology consult appreciated, renal u.s and KUB show stone has either passed or now is in uterovesicular junction  -Given clinical improvement, urology recommends outpatient follow-up with them and encouraging oral intake.  She will strain her urine  -Given her rising white count and low-grade temperature, she was given ceftriaxone x1 with Bactrim x3 days pending urine culture    Elevated blood pressure reading without diagnosis of hypertension  Assessment & Plan  - Patient hypertensive in ED  - Likely at least in part due to pain and anxiety however patient has not seen a medical provider in unclear period of time so unsure if she has hypertension at baseline  - Pain management as needed  - Monitor trends, recommended primary care follow-up to establish preventative care      SUMMARY OF HOSPITALIZATION      Presenting Problem/History of Present Illness  This is a 67 y.o. year-old female admitted on 1/23/2023 with Renal colic [N23]  Renal colic on left side [N23].    67-year-old female with no known past medical history presenting with  left-sided flank pain, nausea and vomiting     HPI: She reports that she went to bed in her normal state of health.  She woke up at 2 AM suddenly with left-sided flank pain that was severe, constant and associated with nausea and nonbloody emesis.  She denies dysuria, shortness of breath or cough.  She has never had symptoms like this before.  CT demonstrated left obstructive kidney stone and urology was notified.  She notes with IV pain medications, her pain has improved to a tolerable level and is described as aching.    Hospital Course    Problem based course for full details, on the day of discharge she felt well without any flank or abdominal pain.  She had no fevers or chills, dysuria.  She was eating and drinking well.  I spoke to her  and the patient about recommendations for follow-up and management as per urology recommendations.    Exam on Day of Discharge  Physical Exam  Gen Well appearing, comfortable AAOX3  CV RR norm s1 and s2 no mrg  Abd +BS soft, NT, ND.no L cva tenderness    Ext NT, No LE edema  Neuro Non focal   No Upton  Psych  Full range affect.     Consults During Admission  IP CONSULT TO UROLOGY    DISCHARGE MEDICATIONS               Medication List      START taking these medications    acetaminophen 325 mg tablet  Commonly known as: TYLENOL  Take 2 tablets (650 mg total) by mouth every 6 (six) hours as needed for fever (specify temp in comments): or pain (fever >100.4).  Dose: 650 mg     ketorolac 10 mg tablet  Commonly known as: TORADOL  Take 1 tablet (10 mg total) by mouth every 6 (six) hours as needed for moderate pain for up to 5 days.  Dose: 10 mg     ondansetron 4 mg tablet  Commonly known as: ZOFRAN  Take 1 tablet (4 mg total) by mouth every 6 (six) hours for 7 days.  Dose: 4 mg     sulfamethoxazole-trimethoprim 800-160 mg per tablet  Commonly known as: BACTRIM DS  Start taking on: January 25, 2023  Take 1 tablet by mouth 2 (two) times a day for 3 days.  Dose: 1 tablet      tamsulosin 0.4 mg capsule  Commonly known as: FLOMAX  Take 1 capsule (0.4 mg total) by mouth nightly. After dinner  Dose: 0.4 mg             Instructions for after discharge     Call provider for:  extreme fatigue      Call provider for:  persistent dizziness or light-headedness      Call provider for:  severe uncontrolled pain      Call provider for:  temperature >100.4      Follow-up with Provider:      Jose Cruz Bolton MD   [phone]    [address], [facility] [address]  [place] [facility] [id number]       Follow-up with primary physician (PCP)      Within 1-2 weeks    Post-Discharge Activity: Normal activity as tolerated.      Normal activity as tolerated.             PROCEDURES / LABS / IMAGING      Operative Procedures  none    Other Procedures  none    Pertinent Labs  labs: below  Results from last 7 days   Lab Units 01/24/23  0700 01/23/23  0515   WBC K/uL 17.37* 11.93*   HEMOGLOBIN g/dL 11.7* 13.6   HEMATOCRIT % 35.5 42.1   PLATELETS K/uL 203 261     Results from last 7 days   Lab Units 01/24/23  0700 01/23/23  0515   SODIUM mEQ/L 136 139   POTASSIUM mEQ/L 3.6 3.4*   CHLORIDE mEQ/L 106 104   CO2 mEQ/L 21* 27   BUN mg/dL 19 27*   CREATININE mg/dL 1.0 0.7   CALCIUM mg/dL 8.2* 9.2   ALBUMIN g/dL  --  4.2   BILIRUBIN TOTAL mg/dL  --  0.7   ALK PHOS IU/L  --  82   ALT IU/L  --  22   AST IU/L  --  21   GLUCOSE mg/dL 137* 183*     Microbiology Results     Procedure Component Value Units Date/Time    SARS-CoV-2 (COVID-19), PCR Nasopharynx [[id number]]  (Normal) Collected: 01/23/23 1052    Specimen: Nasopharyngeal Swab from Nasopharynx Updated: 01/23/23 1130    Narrative:      The following orders were created for panel order SARS-CoV-2 (COVID-19), PCR Nasopharynx.  Procedure                               Abnormality         Status                     ---------                               -----------         ------                     SARS-CoV-2 (COVID-19), P...[[id number]]  Normal              Final result                  Please view results for these tests on the individual orders.    SARS-CoV-2 (COVID-19), PCR Nasopharynx [[id number]]  (Normal) Collected: 01/23/23 1052    Specimen: Nasopharyngeal Swab from Nasopharynx Updated: 01/23/23 1130     SARS-CoV-2 (COVID-19) Negative    Narrative:      Testing performed using real-time PCR for detection of COVID-19. EUA approved validation studies performed on site.       '    SARS-CoV-2 (COVID-19) (no units)   Date/Time Value   01/23/2023 1052 Negative       Pertinent Imaging  CT ABDOMEN PELVIS WITHOUT IV CONTRAST    Result Date: 1/23/2023  IMPRESSION: 1.  Left-sided obstructive uropathy. 2.  Additional chronic and incidental findings as described above.    X-RAY ABDOMEN 1 VIEW    Result Date: 1/24/2023  IMPRESSION: Previously seen left ureteral calculus not definitively visualized by radiography.    ULTRASOUND KIDNEYS    Result Date: 1/24/2023  IMPRESSION: 1. Mild left hydronephrosis with possible migration of the previously demonstrated ureteral calculus to the ureterovesicular junction.      OUTPATIENT  FOLLOW-UP / REFERRALS / PENDING TESTS        Outpatient Follow-Up Appointments  Encounter Information    This patient does not currently have any appointments scheduled.         Referrals  No orders of the defined types were placed in this encounter.      Test Results Pending at Discharge  Unresulted Labs (From admission, onward)     Start     Ordered    01/24/23 0912  Urine culture Urine, Clean Catch  Once        Question:  Release to patient  Answer:  Immediate    01/24/23 0911                Important Issues to Address in Follow-Up  Admitted to establish care with a primary care doctor for blood pressure checks and routine preventative care as well as lab follow-up    Urology in 1 to 2 weeks for kidney stone management    DISCHARGE DISPOSITION AND DESTINATION      Disposition: Home   Destination:                              Code Status At Discharge: Prior    Physician Order for  Life-Sustaining Treatment Document Status      No documents found

## 2023-01-24 NOTE — PLAN OF CARE
Problem: Adult Inpatient Plan of Care  Goal: Plan of Care Review  Flowsheets (Taken 2023 1115)  Progress: improving  Plan of Care Reviewed With: patient  Outcome Summary: Pt agreeable with plan of care.     Problem: Adult Inpatient Plan of Care  Goal: Patient-Specific Goal (Individualized)  Flowsheets (Taken 2023 1117)  Patient-Specific Goals (Include Timeframe): Pt looking forward to being able to have something to eat today.     Problem: Adult Inpatient Plan of Care  Goal: Readiness for Transition of Care  Intervention: Mutually Develop Transition Plan  Flowsheets (Taken 2023 1119)  Anticipated Discharge Disposition: home without assistance or services  Equipment Needed After Discharge: none  Assistive Device/Animal Currently Used at Home: none  Anticipated Changes Related to Illness: none  Readmission Within the Last 30 Days: no previous admission in last 30 days  Patient/Family Anticipated Services at Transition: none  Patient/Family Anticipates Transition to: home with family  Transportation Anticipated: family or friend will provide    Discharge Coordination/Progress Note:  Met with patient at bedside to discuss discharge planning. Explained role of Care Coordination Team. Verified , ID and demographic information. Discharge plan initiated.  PCP: Pt does not have a PCP at this time. Health system Physician list provided to pt.    Living Arrangements: Pt lives with spouse in a 2 story home. Pt states that there are 3 steps a landing then another 4 steps into the home. Pt states he has 12-13 steps to the second floor where her bedroom and bathroom is located.    Prior level of care: Independent PTA.    Emergency Contact:  Pt's spouse is listed as emergency contact.    Home Care/ Current Needs: none  SNF/TCC: no  Pharmacy: [facility] in [place], [place]   COVID vaccine status: Pt received Moderna vaccine x3  Influenza vaccination: no  Insurance: Medicare/ [employer] Senior Supplemental.  Transportation:  Pt  states she will have a ride home upon discharge.    Social Determinates of Health: Pt has food, clothing, housing and basic needs.   Anticipated Discharge Needs/Disposition: Pt is currently receiving IVF and IV Abx. Diet to be advanced for lunch. No skilled needs identified at this time. CC will continue to follow.           Signed by: Alana Mcgarry RN BSN,CC x [phone]

## 2023-01-24 NOTE — PATIENT CARE CONFERENCE
Care Progression Rounds Note  Date: 1/24/2023  Time: 1:21 PM     Patient Name: Milagros Hoffman     Medical Record Number: [id number]   YOB: 1955  Sex: Female      Room/Bed: 4204W    Admitting Diagnosis: Renal colic [N23]  Renal colic on left side [N23]   Admit Date/Time: 1/23/2023  4:55 AM    Primary Diagnosis: Obstructive uropathy  Principal Problem: Obstructive uropathy    GMLOS: pending  Anticipated Discharge Date: 1/24/2023    AM-PAC:  Mobility Score:      Discharge Planning:  Current Living Arrangements: home  Concerns to be Addressed: discharge planning  Anticipated Discharge Disposition: home without assistance or services    Barriers to Discharge:  Medical issues not resolved    Comments:  iv abx  and fluids    Participants:  occupational therapy, social work/services, nursing, , physical therapy, advanced practice provider

## 2023-01-24 NOTE — PATIENT CARE CONFERENCE
Care Progression Rounds Note  Date: 1/24/2023  Time: 1:21 PM     Patient Name: Milagros Hoffman     Medical Record Number: [id number]   YOB: 1955  Sex: Female      Room/Bed: 4204W    Admitting Diagnosis: Renal colic [N23]  Renal colic on left side [N23]   Admit Date/Time: 1/23/2023  4:55 AM    Primary Diagnosis: Obstructive uropathy  Principal Problem: Obstructive uropathy    GMLOS: pending  Anticipated Discharge Date: 1/24/2023    AM-PAC:  Mobility Score:      Discharge Planning:  Current Living Arrangements: home  Concerns to be Addressed: discharge planning  Anticipated Discharge Disposition: home without assistance or services    Barriers to Discharge:  Medical issues not resolved    Comments:  iv abx  and fluids    Participants:  occupational therapy, social work/services, nursing, , physical therapy, advanced practice provider

## 2023-01-24 NOTE — PLAN OF CARE
Problem: Adult Inpatient Plan of Care  Goal: Patient-Specific Goal (Individualized)  Outcome: Progressing  Flowsheets (Taken 1/24/2023 1123)  Individualized Care Needs: Regular diet, IVF, IV abx, clean catch urine.     Problem: Adult Inpatient Plan of Care  Goal: Patient-Specific Goal (Individualized)  Outcome: Progressing  Flowsheets (Taken 1/24/2023 1123)  Individualized Care Needs: Regular diet, IVF, IV abx, clean catch urine.   Plan of Care Review  Plan of Care Reviewed With: patient  Progress: improving  Outcome Summary: Pt resting in bed comfortably. IVF infusing slong with IV abx. Advanced to regular diet. Xray of the abdomen pending and ultrasound kidneys pending. All safety measures in place. Call bell within reach.

## 2023-01-24 NOTE — PLAN OF CARE
Problem: Adult Inpatient Plan of Care  Goal: Plan of Care Review  Outcome: Progressing  Flowsheets (Taken 1/24/2023 0336)  Progress: improving  Plan of Care Reviewed With: patient  Outcome Summary: Pt AAOx3 resting comfortably with no reports of Abd pain / flank pain made durings shift. Pt OOB to bathroom to urinate - urine strained. Pt maintains NPO status after midnight and IVF administered in LAC as per order. Temp slightly elevated @ 100.3 - given Tylenol as per order. Safety rounds performed. Bed alarm activated and call bell within reach.  Goal: Patient-Specific Goal (Individualized)  Outcome: Progressing  Flowsheets (Taken 1/24/2023 0336)  Individualized Care Needs: NPO, IVF, strain urine  Goal: Absence of Hospital-Acquired Illness or Injury  Outcome: Progressing  Goal: Optimal Comfort and Wellbeing  Outcome: Progressing  Goal: Readiness for Transition of Care  Outcome: Progressing       Plan of Care Review  Plan of Care Reviewed With: patient  Progress: improving  Outcome Summary: Pt AAOx3 resting comfortably with no reports of Abd pain / flank pain made durings shift. Pt OOB to bathroom to urinate - urine strained. Pt maintains NPO status after midnight and IVF administered in LAC as per order. Temp slightly elevated @ 100.3 - given Tylenol as per order. Safety rounds performed. Bed alarm activated and call bell within reach.

## 2023-01-25 LAB — BACTERIA UR CULT: NORMAL

## 2023-02-08 ENCOUNTER — TRANSCRIBE ORDERS (OUTPATIENT)
Dept: SCHEDULING | Age: 68
End: 2023-02-08

## 2023-02-08 DIAGNOSIS — N20.0 CALCULUS OF KIDNEY: Primary | ICD-10-CM

## 2023-08-01 ENCOUNTER — HOSPITAL ENCOUNTER (OUTPATIENT)
Dept: RADIOLOGY | Age: 68
Discharge: HOME | End: 2023-08-01
Attending: STUDENT IN AN ORGANIZED HEALTH CARE EDUCATION/TRAINING PROGRAM
Payer: MEDICARE

## 2023-08-01 DIAGNOSIS — N20.0 CALCULUS OF KIDNEY: ICD-10-CM

## 2023-08-01 PROCEDURE — 74018 RADEX ABDOMEN 1 VIEW: CPT

## 2023-08-01 PROCEDURE — 76775 US EXAM ABDO BACK WALL LIM: CPT

## 2023-08-09 ENCOUNTER — TRANSCRIBE ORDERS (OUTPATIENT)
Dept: SCHEDULING | Age: 68
End: 2023-08-09

## 2023-08-09 DIAGNOSIS — N20.0 CALCULUS OF KIDNEY: Primary | ICD-10-CM

## 2024-07-30 ENCOUNTER — HOSPITAL ENCOUNTER (OUTPATIENT)
Dept: RADIOLOGY | Age: 69
Discharge: HOME | End: 2024-07-30
Attending: STUDENT IN AN ORGANIZED HEALTH CARE EDUCATION/TRAINING PROGRAM
Payer: MEDICARE

## 2024-07-30 DIAGNOSIS — N20.0 CALCULUS OF KIDNEY: ICD-10-CM

## 2024-07-30 PROCEDURE — 74018 RADEX ABDOMEN 1 VIEW: CPT

## 2024-07-30 PROCEDURE — 76775 US EXAM ABDO BACK WALL LIM: CPT

## 2024-09-11 ENCOUNTER — CARE COORDINATION (OUTPATIENT)
Dept: OTHER | Facility: CLINIC | Age: 69
End: 2024-09-11

## 2024-09-11 PROBLEM — R03.0 ELEVATED BLOOD PRESSURE READING WITHOUT DIAGNOSIS OF HYPERTENSION: Status: ACTIVE | Noted: 2023-01-23

## 2024-11-15 PROBLEM — R03.0 ELEVATED BLOOD PRESSURE READING WITHOUT DIAGNOSIS OF HYPERTENSION: Status: RESOLVED | Noted: 2023-01-23 | Resolved: 2024-11-15

## 2024-11-15 PROBLEM — N13.9 OBSTRUCTIVE UROPATHY: Status: RESOLVED | Noted: 2023-01-23 | Resolved: 2024-11-15

## 2024-11-15 NOTE — PROGRESS NOTES
[facility] Internal Medicine at [facility]  History & Physical     Some or all of the below documentation was generated using voice recognition/dictation software. Please note this dictation software can have anomalies in its ability to transcribe. Please excuse errors which may have occurred due to this, and do not hesitate to contact me directly for anything that seems abnormal for clarification.     The following have been reviewed and updated as appropriate in this visit:    Patient ID: Milagros Hoffman is a 69 y.o. female no known pmhx presents for establishment of care.     Subjective     HPI    Hasn't seen PCP in awhile. She states she is not a doctor person. She started in April with bad allergies. She went to the eye doctor and they told her she had eye issues. She had eye infection. Went to [facility] they took care of the eye infection. [facility] sent her to a PCP In sept for medical clearance. She had BW then. She is still in process of scheduling her eye surgery.   She had covid in sept. In October it cleared. She had a cough after covid. A week into that went to ,  put her on an abx. Her BP was high there and she was started on Losartan in October. She still has this dry cough which she states is likely allergy related.  Has not tried anything over-the-counter for it.  She states that she feels like she has a lot of postnasal drip and general sinus inflammation    Keratoconjunctivitis, senile entropion of R lower lid: saw optho in aug  Getting surgery on right lower lid    Hx of obstructive uropathy: hospitalized 1/2023 for this     HTN: on losartan 50mg daily. Anytime she goes to the doctor she has high BP. Used to be SBP>200.  Started this medication in October  Since she was started on medication, she has been checking Bps at home usually in 150s. Has never been lower than 140.Denies chest pain, SOB, nausea, vomiting, lightheadedness, dizziness, blurry vision, headaches.     Health Maintenance:  "declines most preventative screenings. She states \"getting here is a lot\" she does not want to do preventative screenings  -Colon Cancer Screening:declines  -Breast Cancer Screening: declines   -Osteoporosis:scripts declines   - tobacco: never  - alcohol: socially  - illicit Rx: denies   - occupation: retired; used to conty manager  - menstrual periods: post menopausal no bleeding  - Feels safe at home? Lives with , feels safe  -Wellness Visit: defer to next visit     Immunizations:declines all today           Past Medical History:   Diagnosis Date    HTN (hypertension)     Obstructive uropathy 01/23/2023       No past surgical history on file.    Current Outpatient Medications   Medication Sig Dispense Refill    erythromycin (ILOTYCIN) 5 mg/gram (0.5 %) ophthalmic ointment Apply to right eye 2 (two) times a day.      losartan (COZAAR) 100 mg tablet Take 1 tablet (100 mg total) by mouth daily.       No current facility-administered medications for this visit.       Allergies   Allergen Reactions    Doxycycline Rash     Red blotching on skin        Social History     Socioeconomic History    Marital status:      Spouse name: Not on file    Number of children: Not on file    Years of education: Not on file    Highest education level: Not on file   Occupational History    Not on file   Tobacco Use    Smoking status: Never    Smokeless tobacco: Never   Substance and Sexual Activity    Alcohol use: Never    Drug use: Never    Sexual activity: Not on file   Other Topics Concern    Not on file   Social History Narrative    Not on file     Social Drivers of Health     Financial Resource Strain: Not on file   Food Insecurity: No Food Insecurity (1/23/2023)    Hunger Vital Sign     Worried About Running Out of Food in the Last Year: Never true     Ran Out of Food in the Last Year: Never true   Transportation Needs: Not on file   Physical Activity: Not on file   Stress: Not on file   Social Connections: Not on " "file   Intimate Partner Violence: Not on file   Housing Stability: Not on file       No family history on file.      The following have been reviewed and updated as appropriate in this visit:   Allergies  Meds  Problems         Review of Systems      Objective     Visit Vitals  BP (!) 170/100 (BP Location: Left upper arm, Patient Position: Sitting)   Pulse 94   Resp 16   Ht 1.499 m (4' 11\")   Wt 61.5 kg (135 lb 9.6 oz)   SpO2 98%   BMI 27.39 kg/m²       Physical Exam  Constitutional: Appears well-developed and well-nourished. No distress.   Neck: Normal range of motion. Neck supple.  Throat: uvula midline. Pharynx non erythematous, no ulcers or thrush. Post nasal drip appreciated  Cardiovascular: Normal rate, regular rhythm and normal heart sounds.  Exam reveals no gallop and no friction rub. No murmur heard.  Pulmonary/Chest: Effort normal. No respiratory distress. CTA  b/l. No rales.       Assessment/Plan   Problem List Items Addressed This Visit       Encounter to establish care - Primary    Relevant Orders    CBC and Differential    Comprehensive metabolic panel    Primary hypertension     Blood pressure elevated.  I suspect they are she is asymptomatic.  Will increase losartan to 100 mg daily.  Follow-up blood work.  She will keep a blood pressure log over the next few weeks and follow-up for repeat blood pressure check.  She becomes symptomatic she will call the office or go to the ED.         Relevant Medications    losartan (COZAAR) 100 mg tablet    Other Relevant Orders    Lipid panel    Chronic cough     I suspect this is due to postnasal drip and allergic rhinitis.  Recommended the following today: Recommended taking an antihistamine , such as Allegra/Claratin/Zyrtec once daily consistently for the next few weeks.  Take Flonase 1 spray in each nostril once a day for the next few weeks.  We went over proper nasal spray technique.        We discussed extensively how these symptoms are provoked by " inflammation in nasal passages and sinuses which in turn produces excess mucus which causes congestion and chronic dry cough.  The goal of the antihistamines is to calm down inflammation and stop mucus production.  We discussed that these changes will not happen overnight but can take several weeks.     Other supportive things patient can try is air filters and avoiding known allergen exposures.             Other Visit Diagnoses       Encounter for screening mammogram for breast cancer        Encounter for screening for malignant neoplasm of colon        Encounter for hepatitis C screening test for low risk patient        Encounter for osteoporosis screening in asymptomatic postmenopausal patient        Low HDL (under 40)        Relevant Orders    Lipid panel    Elevated glucose        Relevant Orders    Hemoglobin A1c    Fatigue, unspecified type        Relevant Orders    TSH w reflex FT4    Hypovitaminosis D        Relevant Orders    Vitamin D 25 hydroxy    Screening for hyperlipidemia        Relevant Orders    Lipid panel            Return in about 4 weeks (around 12/23/2024) for blood pressure check- with mai.         Written education and action steps suggested to the patient are documented in After Visit Summary / Patient Instructions sections of this encounter.        Jess Willson DO MS

## 2024-11-25 ENCOUNTER — OFFICE VISIT (OUTPATIENT)
Dept: INTERNAL MEDICINE | Facility: CLINIC | Age: 69
End: 2024-11-25
Payer: MEDICARE

## 2024-11-25 ENCOUNTER — APPOINTMENT (OUTPATIENT)
Dept: LAB | Age: 69
End: 2024-11-25
Attending: STUDENT IN AN ORGANIZED HEALTH CARE EDUCATION/TRAINING PROGRAM
Payer: MEDICARE

## 2024-11-25 VITALS
HEIGHT: 59 IN | OXYGEN SATURATION: 98 % | SYSTOLIC BLOOD PRESSURE: 170 MMHG | DIASTOLIC BLOOD PRESSURE: 100 MMHG | RESPIRATION RATE: 16 BRPM | BODY MASS INDEX: 27.34 KG/M2 | WEIGHT: 135.6 LBS | HEART RATE: 94 BPM

## 2024-11-25 DIAGNOSIS — Z13.820 ENCOUNTER FOR OSTEOPOROSIS SCREENING IN ASYMPTOMATIC POSTMENOPAUSAL PATIENT: ICD-10-CM

## 2024-11-25 DIAGNOSIS — Z12.11 ENCOUNTER FOR SCREENING FOR MALIGNANT NEOPLASM OF COLON: ICD-10-CM

## 2024-11-25 DIAGNOSIS — Z76.89 ENCOUNTER TO ESTABLISH CARE: Primary | ICD-10-CM

## 2024-11-25 DIAGNOSIS — Z12.31 ENCOUNTER FOR SCREENING MAMMOGRAM FOR BREAST CANCER: ICD-10-CM

## 2024-11-25 DIAGNOSIS — E55.9 HYPOVITAMINOSIS D: ICD-10-CM

## 2024-11-25 DIAGNOSIS — Z76.89 ENCOUNTER TO ESTABLISH CARE: ICD-10-CM

## 2024-11-25 DIAGNOSIS — Z13.220 SCREENING FOR HYPERLIPIDEMIA: ICD-10-CM

## 2024-11-25 DIAGNOSIS — R05.3 CHRONIC COUGH: ICD-10-CM

## 2024-11-25 DIAGNOSIS — I10 PRIMARY HYPERTENSION: ICD-10-CM

## 2024-11-25 DIAGNOSIS — Z78.0 ENCOUNTER FOR OSTEOPOROSIS SCREENING IN ASYMPTOMATIC POSTMENOPAUSAL PATIENT: ICD-10-CM

## 2024-11-25 DIAGNOSIS — Z11.59 ENCOUNTER FOR HEPATITIS C SCREENING TEST FOR LOW RISK PATIENT: ICD-10-CM

## 2024-11-25 DIAGNOSIS — R73.09 ELEVATED GLUCOSE: ICD-10-CM

## 2024-11-25 DIAGNOSIS — R53.83 FATIGUE, UNSPECIFIED TYPE: ICD-10-CM

## 2024-11-25 DIAGNOSIS — E78.6 LOW HDL (UNDER 40): ICD-10-CM

## 2024-11-25 LAB
25(OH)D3 SERPL-MCNC: 22 NG/ML (ref 30–100)
ALBUMIN SERPL-MCNC: 4.4 G/DL (ref 3.5–5.7)
ALP SERPL-CCNC: 81 IU/L (ref 34–125)
ALT SERPL-CCNC: 13 IU/L (ref 7–52)
ANION GAP SERPL CALC-SCNC: 5 MEQ/L (ref 3–15)
AST SERPL-CCNC: 13 IU/L (ref 13–39)
BASOPHILS # BLD: 0.07 K/UL (ref 0.01–0.1)
BASOPHILS NFR BLD: 0.8 %
BILIRUB SERPL-MCNC: 0.7 MG/DL (ref 0.3–1.2)
BUN SERPL-MCNC: 16 MG/DL (ref 7–25)
CALCIUM SERPL-MCNC: 9.4 MG/DL (ref 8.6–10.3)
CHLORIDE SERPL-SCNC: 106 MEQ/L (ref 98–107)
CHOLEST SERPL-MCNC: 165 MG/DL
CO2 SERPL-SCNC: 31 MEQ/L (ref 21–31)
CREAT SERPL-MCNC: 0.6 MG/DL (ref 0.6–1.2)
DIFFERENTIAL METHOD BLD: ABNORMAL
EGFRCR SERPLBLD CKD-EPI 2021: >60 ML/MIN/1.73M*2
EOSINOPHIL # BLD: 0.23 K/UL (ref 0.04–0.36)
EOSINOPHIL NFR BLD: 2.8 %
ERYTHROCYTE [DISTWIDTH] IN BLOOD BY AUTOMATED COUNT: 13.3 % (ref 11.7–14.4)
EST. AVERAGE GLUCOSE BLD GHB EST-MCNC: 103 MG/DL
GLUCOSE SERPL-MCNC: 98 MG/DL (ref 70–99)
HBA1C MFR BLD: 5.2 %
HCT VFR BLD AUTO: 42.6 % (ref 35–45)
HDLC SERPL-MCNC: 49 MG/DL
HDLC SERPL: 3.4 {RATIO}
HGB BLD-MCNC: 13.6 G/DL (ref 11.8–15.7)
IMM GRANULOCYTES # BLD AUTO: 0.02 K/UL (ref 0–0.08)
IMM GRANULOCYTES NFR BLD AUTO: 0.2 %
LDLC SERPL CALC-MCNC: 104 MG/DL
LYMPHOCYTES # BLD: 2.05 K/UL (ref 1.2–3.5)
LYMPHOCYTES NFR BLD: 24.5 %
MCH RBC QN AUTO: 29.5 PG (ref 28–33.2)
MCHC RBC AUTO-ENTMCNC: 31.9 G/DL (ref 32.2–35.5)
MCV RBC AUTO: 92.4 FL (ref 83–98)
MONOCYTES # BLD: 0.54 K/UL (ref 0.28–0.8)
MONOCYTES NFR BLD: 6.5 %
NEUTROPHILS # BLD: 5.45 K/UL (ref 1.7–7)
NEUTS SEG NFR BLD: 65.2 %
NONHDLC SERPL-MCNC: 116 MG/DL
NRBC BLD-RTO: 0 %
PLATELET # BLD AUTO: 252 K/UL (ref 150–369)
PMV BLD AUTO: 9.2 FL (ref 9.4–12.3)
POTASSIUM SERPL-SCNC: 4.3 MEQ/L (ref 3.5–5.1)
PROT SERPL-MCNC: 6.8 G/DL (ref 6–8.2)
RBC # BLD AUTO: 4.61 M/UL (ref 3.93–5.22)
SODIUM SERPL-SCNC: 142 MEQ/L (ref 136–145)
TRIGL SERPL-MCNC: 60 MG/DL
TSH SERPL DL<=0.05 MIU/L-ACNC: 1.42 MIU/L (ref 0.34–5.6)
WBC # BLD AUTO: 8.36 K/UL (ref 3.8–10.5)

## 2024-11-25 PROCEDURE — 83036 HEMOGLOBIN GLYCOSYLATED A1C: CPT

## 2024-11-25 PROCEDURE — 99204 OFFICE O/P NEW MOD 45 MIN: CPT | Performed by: STUDENT IN AN ORGANIZED HEALTH CARE EDUCATION/TRAINING PROGRAM

## 2024-11-25 PROCEDURE — G8753 SYS BP > OR = 140: HCPCS | Performed by: STUDENT IN AN ORGANIZED HEALTH CARE EDUCATION/TRAINING PROGRAM

## 2024-11-25 PROCEDURE — 82306 VITAMIN D 25 HYDROXY: CPT

## 2024-11-25 PROCEDURE — 80061 LIPID PANEL: CPT

## 2024-11-25 PROCEDURE — 85025 COMPLETE CBC W/AUTO DIFF WBC: CPT

## 2024-11-25 PROCEDURE — 80053 COMPREHEN METABOLIC PANEL: CPT

## 2024-11-25 PROCEDURE — 84443 ASSAY THYROID STIM HORMONE: CPT

## 2024-11-25 PROCEDURE — G8755 DIAS BP > OR = 90: HCPCS | Performed by: STUDENT IN AN ORGANIZED HEALTH CARE EDUCATION/TRAINING PROGRAM

## 2024-11-25 PROCEDURE — 36415 COLL VENOUS BLD VENIPUNCTURE: CPT

## 2024-11-25 RX ORDER — ERYTHROMYCIN 5 MG/G
OINTMENT OPHTHALMIC 2 TIMES DAILY
COMMUNITY
Start: 2024-09-24 | End: 2025-03-12

## 2024-11-25 RX ORDER — LOSARTAN POTASSIUM 100 MG/1
100 TABLET ORAL DAILY
COMMUNITY
Start: 2024-11-25 | End: 2024-12-05 | Stop reason: SDUPTHER

## 2024-11-25 RX ORDER — LOSARTAN POTASSIUM 50 MG/1
50 TABLET ORAL DAILY
COMMUNITY
Start: 2024-11-22 | End: 2024-11-25

## 2024-11-25 ASSESSMENT — PATIENT HEALTH QUESTIONNAIRE - PHQ9: SUM OF ALL RESPONSES TO PHQ9 QUESTIONS 1 & 2: 0

## 2024-11-25 NOTE — ASSESSMENT & PLAN NOTE
Blood pressure elevated.  I suspect they are she is asymptomatic.  Will increase losartan to 100 mg daily.  Follow-up blood work.  She will keep a blood pressure log over the next few weeks and follow-up for repeat blood pressure check.  She becomes symptomatic she will call the office or go to the ED.

## 2024-11-25 NOTE — ASSESSMENT & PLAN NOTE
I suspect this is due to postnasal drip and allergic rhinitis.  Recommended the following today: Recommended taking an antihistamine , such as Allegra/Claratin/Zyrtec once daily consistently for the next few weeks.  Take Flonase 1 spray in each nostril once a day for the next few weeks.  We went over proper nasal spray technique.        We discussed extensively how these symptoms are provoked by inflammation in nasal passages and sinuses which in turn produces excess mucus which causes congestion and chronic dry cough.  The goal of the antihistamines is to calm down inflammation and stop mucus production.  We discussed that these changes will not happen overnight but can take several weeks.     Other supportive things patient can try is air filters and avoiding known allergen exposures.

## 2024-12-05 ENCOUNTER — TELEPHONE (OUTPATIENT)
Dept: INTERNAL MEDICINE | Facility: CLINIC | Age: 69
End: 2024-12-05
Payer: MEDICARE

## 2024-12-05 RX ORDER — LOSARTAN POTASSIUM 100 MG/1
100 TABLET ORAL DAILY
Qty: 90 TABLET | Refills: 0 | Status: SHIPPED | OUTPATIENT
Start: 2024-12-05 | End: 2025-01-29

## 2024-12-05 NOTE — TELEPHONE ENCOUNTER
Medication Request   Patient PCP: Jess Willson, DO  Next Office Visit: 12/18/2024  Has this provider prescribed this medication before?: No. [facility] by [facility]      Losartan 100 mg       Pt had an old script for 50 mgs but since 11/18/24 she was advised to take 2 tabs daily. Pt will need new script sent in with the 100 mg dosing     Pt requested call once medication is sent into the pharmacy      Preferred Pharmacy:   [facility]/pharmacy #[id number] - [place] - [address] AT [facility]  [address] [address]  Phone: [phone] Fax: [phone]      Please advise. Pt's# [phone]      Please allow 2 business days for your provider to send your medication request or to reach out to discuss.

## 2024-12-18 ENCOUNTER — OFFICE VISIT (OUTPATIENT)
Dept: INTERNAL MEDICINE | Facility: CLINIC | Age: 69
End: 2024-12-18
Payer: MEDICARE

## 2024-12-18 VITALS
OXYGEN SATURATION: 98 % | WEIGHT: 136 LBS | HEIGHT: 59 IN | BODY MASS INDEX: 27.42 KG/M2 | TEMPERATURE: 98 F | HEART RATE: 79 BPM | RESPIRATION RATE: 17 BRPM | SYSTOLIC BLOOD PRESSURE: 150 MMHG | DIASTOLIC BLOOD PRESSURE: 86 MMHG

## 2024-12-18 DIAGNOSIS — E55.9 VITAMIN D DEFICIENCY: ICD-10-CM

## 2024-12-18 DIAGNOSIS — I10 PRIMARY HYPERTENSION: Primary | ICD-10-CM

## 2024-12-18 DIAGNOSIS — E78.5 HYPERLIPIDEMIA, UNSPECIFIED HYPERLIPIDEMIA TYPE: ICD-10-CM

## 2024-12-18 PROBLEM — H02.032 SENILE ENTROPION OF RIGHT LOWER EYELID: Status: ACTIVE | Noted: 2024-12-18

## 2024-12-18 PROBLEM — H25.10 NUCLEAR SENILE CATARACT: Status: ACTIVE | Noted: 2024-12-18

## 2024-12-18 PROBLEM — H16.223 KERATOCONJUNCTIVITIS SICCA, NOT SPECIFIED AS SJOGREN'S, BILATERAL: Status: ACTIVE | Noted: 2024-12-18

## 2024-12-18 PROBLEM — H16.041 MARGINAL CORNEAL ULCER, RIGHT EYE: Status: ACTIVE | Noted: 2024-12-18

## 2024-12-18 PROBLEM — H02.009: Status: ACTIVE | Noted: 2024-12-18

## 2024-12-18 PROBLEM — H04.219 EPIPHORA DUE TO EXCESS LACRIMATION: Status: ACTIVE | Noted: 2024-12-18

## 2024-12-18 PROBLEM — H52.10 MYOPIA: Status: ACTIVE | Noted: 2024-12-18

## 2024-12-18 PROCEDURE — 99213 OFFICE O/P EST LOW 20 MIN: CPT | Performed by: NURSE PRACTITIONER

## 2024-12-18 PROCEDURE — G8753 SYS BP > OR = 140: HCPCS | Performed by: NURSE PRACTITIONER

## 2024-12-18 PROCEDURE — G8754 DIAS BP LESS 90: HCPCS | Performed by: NURSE PRACTITIONER

## 2024-12-18 RX ORDER — ATORVASTATIN CALCIUM 10 MG/1
10 TABLET, FILM COATED ORAL DAILY
Qty: 90 TABLET | Refills: 1 | Status: SHIPPED | OUTPATIENT
Start: 2024-12-18 | End: 2025-03-10 | Stop reason: SDUPTHER

## 2024-12-18 RX ORDER — ERGOCALCIFEROL 1.25 MG/1
50000 CAPSULE ORAL WEEKLY
Qty: 12 CAPSULE | Refills: 0 | Status: SHIPPED | OUTPATIENT
Start: 2024-12-18 | End: 2025-03-12

## 2024-12-18 RX ORDER — HYDROCHLOROTHIAZIDE 12.5 MG/1
12.5 TABLET ORAL DAILY
Qty: 90 TABLET | Refills: 0 | Status: SHIPPED | OUTPATIENT
Start: 2024-12-18 | End: 2025-01-29

## 2024-12-18 ASSESSMENT — ENCOUNTER SYMPTOMS
DIZZINESS: 0
SHORTNESS OF BREATH: 0
PALPITATIONS: 0
LIGHT-HEADEDNESS: 0
HEADACHES: 0
CHEST TIGHTNESS: 0

## 2024-12-18 NOTE — ASSESSMENT & PLAN NOTE
Blood pressure remains elevated but improved from last appointment. Will add hydrochlorothiazide 12.5 mg daily. When she is due for her next refill of either losartan or hydrochlorothiazide, will combine the medications into 1 tablet. Continue to monitor blood pressures at home. Follow up with any concerning changes or side effects from the medication. She will return to the office in 6 weeks for a blood pressure check.

## 2024-12-18 NOTE — ASSESSMENT & PLAN NOTE
Labs showed a low vitamin D. Will start high dose vitamin D supplement weekly for 12 weeks. Once completed recommend starting a OTC 2000 unit vitamin D supplement daily.

## 2024-12-18 NOTE — ASSESSMENT & PLAN NOTE
Reviewed recent labs and current ASCVD risk of 16.4%. Recommend starting a statin to reduce her overall risk of having a heart attack or stroke. She is hesitant to start medication but agrees to start on something. Start 10 mg atorvastatin daily.

## 2024-12-18 NOTE — PROGRESS NOTES
Family Medicine  [facility] Internal Medicine in [facility]         The following have been reviewed and updated as appropriate in this visit:    Subjective      Patient ID: Milagros Hoffman is a 69 y.o. female.    This patient presents for a blood pressure check. Her blood pressure was elevated at her appointment last month when she established care with Dr. Willson. On 11/25 her losartan was increased from 50 mg daily to 100 mg daily. She denies any chest pain, chest tightness, shortness of breath, lightheadedness, dizziness, or headaches. Blood pressures at home have been running 140/72 to 176/84, averaging in the 150s. Her blood pressure in the office was 148/86. A repeat blood pressure was 150/86. She notes she had a kidney stone about a year ago. She passed that and was told she does still have a small stone and is following up with urology yearly. She had labs done after her appointment which were reviewed today.        Past Medical History:   Diagnosis Date    HTN (hypertension)     Obstructive uropathy 01/23/2023       No past surgical history on file.    Current Outpatient Medications   Medication Sig Dispense Refill    atorvastatin (LIPITOR) 10 mg tablet Take 1 tablet (10 mg total) by mouth daily. 90 tablet 1    ergocalciferol (VITAMIN D2) 1.25 mg (50,000 units) capsule Take 1 capsule (1.25 mg total) by mouth once a week. 12 capsule 0    erythromycin (ILOTYCIN) 5 mg/gram (0.5 %) ophthalmic ointment Apply to right eye 2 (two) times a day.      hydrochlorothiazide 12.5 mg tablet Take 1 tablet (12.5 mg total) by mouth daily. 90 tablet 0    losartan (COZAAR) 100 mg tablet Take 1 tablet (100 mg total) by mouth daily. 90 tablet 0     No current facility-administered medications for this visit.       Allergies   Allergen Reactions    Doxycycline Rash     Red blotching on skin        Social History     Socioeconomic History    Marital status:      Spouse name: Not on file    Number of children: Not on file  "   Years of education: Not on file    Highest education level: Not on file   Occupational History    Not on file   Tobacco Use    Smoking status: Never    Smokeless tobacco: Never   Substance and Sexual Activity    Alcohol use: Never    Drug use: Never    Sexual activity: Not on file   Other Topics Concern    Not on file   Social History Narrative    Not on file     Social Drivers of Health     Financial Resource Strain: Not on file   Food Insecurity: No Food Insecurity (1/23/2023)    Hunger Vital Sign     Worried About Running Out of Food in the Last Year: Never true     Ran Out of Food in the Last Year: Never true   Transportation Needs: Not on file   Physical Activity: Not on file   Stress: Not on file   Social Connections: Not on file   Intimate Partner Violence: Not on file   Housing Stability: Not on file       No family history on file.      The following have been reviewed and updated as appropriate in this visit:   Allergies  Meds  Problems         Review of Systems   Respiratory:  Negative for chest tightness and shortness of breath.    Cardiovascular:  Negative for chest pain and palpitations.   Neurological:  Negative for dizziness, light-headedness and headaches.         Objective     Visit Vitals  BP (!) 150/86 (BP Location: Left upper arm, Patient Position: Sitting)   Pulse 79   Temp 36.7 °C (98 °F) (Temporal)   Resp 17   Ht 1.499 m (4' 11\")   Wt 61.7 kg (136 lb)   SpO2 98%   BMI 27.47 kg/m²       No results found.    Physical Exam  Constitutional:       General: She is not in acute distress.  Eyes:      General: No scleral icterus.        Right eye: No discharge.         Left eye: No discharge.   Cardiovascular:      Rate and Rhythm: Normal rate and regular rhythm.   Pulmonary:      Effort: No respiratory distress.   Musculoskeletal:         General: No swelling.      Cervical back: Normal range of motion and neck supple.      Right lower leg: No edema.      Left lower leg: No edema. "   Lymphadenopathy:      Cervical: No cervical adenopathy.   Skin:     General: Skin is warm and dry.      Findings: No rash.   Neurological:      Mental Status: She is alert and oriented to person, place, and time.   Psychiatric:         Mood and Affect: Mood is anxious.           Assessment/Plan   Problem List Items Addressed This Visit          Circulatory    Primary hypertension - Primary     Blood pressure remains elevated but improved from last appointment. Will add hydrochlorothiazide 12.5 mg daily. When she is due for her next refill of either losartan or hydrochlorothiazide, will combine the medications into 1 tablet. Continue to monitor blood pressures at home. Follow up with any concerning changes or side effects from the medication. She will return to the office in 6 weeks for a blood pressure check.         Relevant Medications    hydrochlorothiazide 12.5 mg tablet       Endocrine/Metabolic    Vitamin D deficiency     Labs showed a low vitamin D. Will start high dose vitamin D supplement weekly for 12 weeks. Once completed recommend starting a OTC 2000 unit vitamin D supplement daily.         Relevant Medications    ergocalciferol (VITAMIN D2) 1.25 mg (50,000 units) capsule       Other    Hyperlipidemia     Reviewed recent labs and current ASCVD risk of 16.4%. Recommend starting a statin to reduce her overall risk of having a heart attack or stroke. She is hesitant to start medication but agrees to start on something. Start 10 mg atorvastatin daily.         Relevant Medications    atorvastatin (LIPITOR) 10 mg tablet       Return in about 6 weeks (around 1/29/2025) for blood pressure check and preop.       Written education and action steps suggested to the patient are documented in After Visit Summary / Patient Instructions sections of this encounter.        SHAY Grey

## 2025-01-29 ENCOUNTER — APPOINTMENT (OUTPATIENT)
Dept: LAB | Age: 70
End: 2025-01-29
Attending: OPHTHALMOLOGY
Payer: MEDICARE

## 2025-01-29 ENCOUNTER — TRANSCRIBE ORDERS (OUTPATIENT)
Dept: LAB | Age: 70
End: 2025-01-29

## 2025-01-29 ENCOUNTER — CONSULT (OUTPATIENT)
Dept: INTERNAL MEDICINE | Facility: CLINIC | Age: 70
End: 2025-01-29
Payer: MEDICARE

## 2025-01-29 VITALS
HEART RATE: 70 BPM | OXYGEN SATURATION: 98 % | HEIGHT: 59 IN | BODY MASS INDEX: 27.5 KG/M2 | SYSTOLIC BLOOD PRESSURE: 140 MMHG | TEMPERATURE: 97.3 F | DIASTOLIC BLOOD PRESSURE: 76 MMHG | WEIGHT: 136.4 LBS | RESPIRATION RATE: 17 BRPM

## 2025-01-29 DIAGNOSIS — H02.009 ENTROPION: Primary | ICD-10-CM

## 2025-01-29 DIAGNOSIS — Z01.818 ENCOUNTER FOR PRE-OPERATIVE EXAMINATION: Primary | ICD-10-CM

## 2025-01-29 DIAGNOSIS — H02.003 ENTROPION OF RIGHT EYELID: ICD-10-CM

## 2025-01-29 DIAGNOSIS — I10 PRIMARY HYPERTENSION: ICD-10-CM

## 2025-01-29 DIAGNOSIS — H02.009 ENTROPION: ICD-10-CM

## 2025-01-29 PROBLEM — H02.032 SENILE ENTROPION OF RIGHT LOWER EYELID: Status: RESOLVED | Noted: 2024-12-18 | Resolved: 2025-01-29

## 2025-01-29 LAB
ANION GAP SERPL CALC-SCNC: 7 MEQ/L (ref 3–15)
BUN SERPL-MCNC: 25 MG/DL (ref 7–25)
CALCIUM SERPL-MCNC: 9.2 MG/DL (ref 8.6–10.3)
CHLORIDE SERPL-SCNC: 104 MEQ/L (ref 98–107)
CO2 SERPL-SCNC: 30 MEQ/L (ref 21–31)
CREAT SERPL-MCNC: 0.6 MG/DL (ref 0.6–1.2)
EGFRCR SERPLBLD CKD-EPI 2021: >60 ML/MIN/1.73M*2
GLUCOSE SERPL-MCNC: 86 MG/DL (ref 70–99)
POTASSIUM SERPL-SCNC: 3.7 MEQ/L (ref 3.5–5.1)
SODIUM SERPL-SCNC: 141 MEQ/L (ref 136–145)

## 2025-01-29 PROCEDURE — G8753 SYS BP > OR = 140: HCPCS | Performed by: NURSE PRACTITIONER

## 2025-01-29 PROCEDURE — G8754 DIAS BP LESS 90: HCPCS | Performed by: NURSE PRACTITIONER

## 2025-01-29 PROCEDURE — 36415 COLL VENOUS BLD VENIPUNCTURE: CPT

## 2025-01-29 PROCEDURE — 93000 ELECTROCARDIOGRAM COMPLETE: CPT | Performed by: NURSE PRACTITIONER

## 2025-01-29 PROCEDURE — 99213 OFFICE O/P EST LOW 20 MIN: CPT | Performed by: NURSE PRACTITIONER

## 2025-01-29 PROCEDURE — 80048 BASIC METABOLIC PNL TOTAL CA: CPT

## 2025-01-29 RX ORDER — LOSARTAN POTASSIUM AND HYDROCHLOROTHIAZIDE 12.5; 1 MG/1; MG/1
1 TABLET ORAL DAILY
Qty: 90 TABLET | Refills: 1 | Status: SHIPPED | OUTPATIENT
Start: 2025-01-29 | End: 2025-07-28

## 2025-01-29 ASSESSMENT — ENCOUNTER SYMPTOMS
ARTHRALGIAS: 0
VOMITING: 0
CHILLS: 0
CHEST TIGHTNESS: 0
FEVER: 0
JOINT SWELLING: 0
DIARRHEA: 0
LIGHT-HEADEDNESS: 0
HEADACHES: 0
FATIGUE: 0
PALPITATIONS: 0
FLANK PAIN: 0
SHORTNESS OF BREATH: 0
EYE REDNESS: 0
CONSTIPATION: 0
DIZZINESS: 0
DIAPHORESIS: 0
EYE PAIN: 0
ABDOMINAL PAIN: 0
COUGH: 0
NAUSEA: 0
SINUS PRESSURE: 0
DYSURIA: 0
SORE THROAT: 0

## 2025-01-29 NOTE — PROGRESS NOTES
Family Medicine  [facility] Internal Medicine in [facility]         The following have been reviewed and updated as appropriate in this visit:    Subjective      Patient ID: Milagros Hoffman is a 69 y.o. female.    This patient presents for a blood pressure check and a preop evaluation. Her blood pressure was elevated so her losartan was increased from 50 mg daily to 100 mg daily in late November. In December hydrochlorothiazide 12.5 mg daily was added. She denies any chest pain, chest tightness, shortness of breath, lightheadedness, dizziness, or headaches. Blood pressures at home have been running around 122-138/60-70, averaging in the high 120s systolic. Her blood pressure in the office was 140/82. A repeat blood pressure was 140/74.  She is scheduled to have surgery to repair her lower eye lid entropion on 2/25/2025. She can feel the lashes rubbing against her eye but hasn't noticed any redness to her eye in a while. She is feeling nervous about the upcoming procedure.        Past Medical History:   Diagnosis Date    HTN (hypertension)     Obstructive uropathy 01/23/2023       No past surgical history on file.    Current Outpatient Medications   Medication Sig Dispense Refill    atorvastatin (LIPITOR) 10 mg tablet Take 1 tablet (10 mg total) by mouth daily. 90 tablet 1    ergocalciferol (VITAMIN D2) 1.25 mg (50,000 units) capsule Take 1 capsule (1.25 mg total) by mouth once a week. 12 capsule 0    erythromycin (ILOTYCIN) 5 mg/gram (0.5 %) ophthalmic ointment Apply to right eye 2 (two) times a day.      losartan-hydrochlorothiazide (HYZAAR) 100-12.5 mg per tablet Take 1 tablet by mouth daily. 90 tablet 1     No current facility-administered medications for this visit.       Allergies   Allergen Reactions    Doxycycline Rash     Red blotching on skin        Social History     Socioeconomic History    Marital status:      Spouse name: Not on file    Number of children: Not on file    Years of education: Not  "on file    Highest education level: Not on file   Occupational History    Not on file   Tobacco Use    Smoking status: Never    Smokeless tobacco: Never   Substance and Sexual Activity    Alcohol use: Never    Drug use: Never    Sexual activity: Not on file   Other Topics Concern    Not on file   Social History Narrative    Not on file     Social Drivers of Health     Financial Resource Strain: Not on file   Food Insecurity: No Food Insecurity (1/23/2023)    Hunger Vital Sign     Worried About Running Out of Food in the Last Year: Never true     Ran Out of Food in the Last Year: Never true   Transportation Needs: Not on file   Physical Activity: Not on file   Stress: Not on file   Social Connections: Not on file   Intimate Partner Violence: Not on file   Housing Stability: Not on file       No family history on file.      The following have been reviewed and updated as appropriate in this visit:   Allergies  Meds  Problems         Review of Systems   Constitutional:  Negative for chills, diaphoresis, fatigue and fever.   HENT:  Negative for congestion, ear pain, sinus pressure and sore throat.    Eyes:  Negative for pain and redness.   Respiratory:  Negative for cough, chest tightness and shortness of breath.    Cardiovascular:  Negative for chest pain and palpitations.   Gastrointestinal:  Negative for abdominal pain, constipation, diarrhea, nausea and vomiting.   Genitourinary:  Negative for dysuria and flank pain.   Musculoskeletal:  Negative for arthralgias and joint swelling.   Skin:  Negative for rash.   Neurological:  Negative for dizziness, light-headedness and headaches.         Objective     Visit Vitals  BP (!) 140/76 (BP Location: Left upper arm, Patient Position: Sitting)   Pulse 70   Temp 36.3 °C (97.3 °F) (Temporal)   Resp 17   Ht 1.499 m (4' 11\")   Wt 61.9 kg (136 lb 6.4 oz)   SpO2 98%   BMI 27.55 kg/m²       No results found.    Physical Exam  Constitutional:       General: She is not in acute " distress.  HENT:      Right Ear: Tympanic membrane, ear canal and external ear normal.      Left Ear: External ear normal. There is impacted cerumen.      Nose: Nose normal. No congestion or rhinorrhea.      Mouth/Throat:      Pharynx: No oropharyngeal exudate or posterior oropharyngeal erythema.   Eyes:      General: No scleral icterus.        Right eye: No discharge.         Left eye: No discharge.   Cardiovascular:      Rate and Rhythm: Normal rate and regular rhythm.      Heart sounds: Normal heart sounds. No murmur heard.  Pulmonary:      Effort: No respiratory distress.      Breath sounds: Normal breath sounds. No stridor. No wheezing, rhonchi or rales.   Abdominal:      General: There is no distension.      Palpations: There is no mass.      Tenderness: There is no abdominal tenderness. There is no guarding.   Musculoskeletal:         General: No swelling.      Cervical back: Normal range of motion and neck supple.      Right lower leg: No edema.      Left lower leg: No edema.   Lymphadenopathy:      Cervical: No cervical adenopathy.   Skin:     General: Skin is warm and dry.      Findings: No rash.   Neurological:      Mental Status: She is alert and oriented to person, place, and time.   Psychiatric:         Mood and Affect: Mood normal.           Assessment/Plan   Problem List Items Addressed This Visit          Circulatory    Primary hypertension     Blood pressure remains slightly elevated in the office but stable at home. Continue losartan 100 mg daily and hydrochlorothiazide 12.5 mg daily. Will send a new prescription for hyzaar so she only needs to take 1 tablet with both medications rather than 2 separate tablets. Continue to monitor blood pressures at home intermittently. Follow up sooner with any changes or concerns.         Relevant Medications    losartan-hydrochlorothiazide (HYZAAR) 100-12.5 mg per tablet       Eye    Unspecified entropion of unspecified eye, unspecified eyelid     Surgery  scheduled for 2/25/2025.            Other    Encounter for pre-operative examination - Primary     EKG stable. Patient appears medically stable for surgery. Paperwork completed and faxed to surgeon.         Relevant Orders    ECG 12 LEAD OFFICE PERFORMED (Completed)       Return in about 4 months (around 5/29/2025) for Medicare Wellness Visit, Follow up.       Written education and action steps suggested to the patient are documented in After Visit Summary / Patient Instructions sections of this encounter.        SHAY Grey

## 2025-01-29 NOTE — ASSESSMENT & PLAN NOTE
Blood pressure remains slightly elevated in the office but stable at home. Continue losartan 100 mg daily and hydrochlorothiazide 12.5 mg daily. Will send a new prescription for hyzaar so she only needs to take 1 tablet with both medications rather than 2 separate tablets. Continue to monitor blood pressures at home intermittently. Follow up sooner with any changes or concerns.

## 2025-01-29 NOTE — ASSESSMENT & PLAN NOTE
EKG stable. Patient appears medically stable for surgery. Paperwork completed and faxed to surgeon.

## 2025-01-31 ENCOUNTER — OFFICE VISIT (OUTPATIENT)
Dept: INTERNAL MEDICINE | Facility: CLINIC | Age: 70
End: 2025-01-31
Payer: MEDICARE

## 2025-01-31 ENCOUNTER — TELEPHONE (OUTPATIENT)
Dept: INTERNAL MEDICINE | Facility: CLINIC | Age: 70
End: 2025-01-31

## 2025-01-31 VITALS
OXYGEN SATURATION: 98 % | SYSTOLIC BLOOD PRESSURE: 144 MMHG | TEMPERATURE: 97.4 F | WEIGHT: 136.4 LBS | BODY MASS INDEX: 27.5 KG/M2 | RESPIRATION RATE: 18 BRPM | HEIGHT: 59 IN | DIASTOLIC BLOOD PRESSURE: 74 MMHG | HEART RATE: 70 BPM

## 2025-01-31 DIAGNOSIS — H61.22 IMPACTED CERUMEN OF LEFT EAR: Primary | ICD-10-CM

## 2025-01-31 PROCEDURE — G8754 DIAS BP LESS 90: HCPCS | Performed by: NURSE PRACTITIONER

## 2025-01-31 PROCEDURE — 69209 REMOVE IMPACTED EAR WAX UNI: CPT | Mod: LT,52 | Performed by: NURSE PRACTITIONER

## 2025-01-31 PROCEDURE — 99214 OFFICE O/P EST MOD 30 MIN: CPT | Mod: 25 | Performed by: NURSE PRACTITIONER

## 2025-01-31 PROCEDURE — G8753 SYS BP > OR = 140: HCPCS | Performed by: NURSE PRACTITIONER

## 2025-01-31 NOTE — TELEPHONE ENCOUNTER
Request for Medical Advice   Patient PCP: Aidee Wahl CRNP    New or Existing Issue: Call back request  Question or Concern: Pt was seen in office on 1/2925 for a preop. Pt was advised to use debrox OTC for her ear. She stated she has used it for 3 days and now she is having trouble hearing out of the left ear. Yesterday she did have pain in the ear but that is gone but she states it is very clogged. Can pt have her ear lavaged?    Please advise. Pt's# [phone]

## 2025-01-31 NOTE — PROGRESS NOTES
Family Medicine  [facility] Internal Medicine in [facility]         The following have been reviewed and updated as appropriate in this visit:    Subjective      Patient ID: Milagros Hoffman is a 69 y.o. female.    This patient presents to have wax flushed from her left ear. She was seen in the office 2 days ago and encouraged to use Debrox drops for about a week and return if needed for wax removal. She has used the drops since Wednesday but now can't hear anything from her left ear. She had some pain yesterday but that has resolved today.        Past Medical History:   Diagnosis Date    HTN (hypertension)     Obstructive uropathy 01/23/2023       No past surgical history on file.    Current Outpatient Medications   Medication Sig Dispense Refill    atorvastatin (LIPITOR) 10 mg tablet Take 1 tablet (10 mg total) by mouth daily. 90 tablet 1    ergocalciferol (VITAMIN D2) 1.25 mg (50,000 units) capsule Take 1 capsule (1.25 mg total) by mouth once a week. 12 capsule 0    erythromycin (ILOTYCIN) 5 mg/gram (0.5 %) ophthalmic ointment Apply to right eye 2 (two) times a day.      losartan-hydrochlorothiazide (HYZAAR) 100-12.5 mg per tablet Take 1 tablet by mouth daily. 90 tablet 1     No current facility-administered medications for this visit.       Allergies   Allergen Reactions    Doxycycline Rash     Red blotching on skin        Social History     Socioeconomic History    Marital status:      Spouse name: Not on file    Number of children: Not on file    Years of education: Not on file    Highest education level: Not on file   Occupational History    Not on file   Tobacco Use    Smoking status: Never    Smokeless tobacco: Never   Substance and Sexual Activity    Alcohol use: Never    Drug use: Never    Sexual activity: Not on file   Other Topics Concern    Not on file   Social History Narrative    Not on file     Social Drivers of Health     Financial Resource Strain: Not on file   Food Insecurity: No Food  "Insecurity (1/23/2023)    Hunger Vital Sign     Worried About Running Out of Food in the Last Year: Never true     Ran Out of Food in the Last Year: Never true   Transportation Needs: Not on file   Physical Activity: Not on file   Stress: Not on file   Social Connections: Not on file   Intimate Partner Violence: Not on file   Housing Stability: Not on file       No family history on file.      The following have been reviewed and updated as appropriate in this visit:   Allergies  Meds  Problems         Review of Systems  Per HPI      Objective     Visit Vitals  BP (!) 144/74 (BP Location: Right upper arm, Patient Position: Sitting)   Pulse 70   Temp 36.3 °C (97.4 °F) (Temporal)   Resp 18   Ht 1.499 m (4' 11\")   Wt 61.9 kg (136 lb 6.4 oz)   SpO2 98%   BMI 27.55 kg/m²       No results found.    Physical Exam  Constitutional:       General: She is not in acute distress.  HENT:      Right Ear: Tympanic membrane, ear canal and external ear normal.      Left Ear: External ear normal. There is impacted cerumen.      Ears:      Comments: Left canal very narrowed  Eyes:      General: No scleral icterus.        Right eye: No discharge.         Left eye: No discharge.   Cardiovascular:      Rate and Rhythm: Normal rate and regular rhythm.   Pulmonary:      Effort: Pulmonary effort is normal. No respiratory distress.   Musculoskeletal:         General: No swelling.      Cervical back: Normal range of motion and neck supple.      Right lower leg: No edema.      Left lower leg: No edema.   Lymphadenopathy:      Cervical: No cervical adenopathy.   Skin:     General: Skin is warm and dry.      Findings: No rash.   Neurological:      Mental Status: She is alert and oriented to person, place, and time.   Psychiatric:         Mood and Affect: Mood normal.           Assessment/Plan   Problem List Items Addressed This Visit          Ears/Nose/Throat    Impacted cerumen of left ear - Primary     Wax unable to be removed from left ear " canal. Continue debrox twice daily.  Return Monday.         Relevant Orders    Ear Cerumen Removal       Return in 3 days (on 2/3/2025).       Written education and action steps suggested to the patient are documented in After Visit Summary / Patient Instructions sections of this encounter.        SHAY Grey

## 2025-02-01 NOTE — PROCEDURES
Ear Cerumen Removal    Date/Time: 1/31/2025 7:26 PM    Performed by: Aidee Wahl CRNP  Authorized by: Aidee Wahl CRNP    Consent:     Consent obtained:  Verbal    Consent given by:  Patient    Risks, benefits, and alternatives were discussed: yes      Risks discussed:  Dizziness, incomplete removal and pain    Alternatives discussed:  No treatment and delayed treatment  Procedure details:     Location:  L ear    Procedure type: irrigation      Procedure outcomes: unable to remove cerumen    Post-procedure details:     Inspection:  Some cerumen remaining (Ear canal red and irritated from irrigation)    Hearing quality:  Improved (Patient reports improvement in hearing but not full resolution of hearing.)    Procedure completion:  Procedure terminated electively by provider  Comments:      Ear canal narrowed and wax is not breaking up to be removed.  Recommend continued use of Debrox and return for removal at a later date.

## 2025-02-03 ENCOUNTER — OFFICE VISIT (OUTPATIENT)
Dept: INTERNAL MEDICINE | Facility: CLINIC | Age: 70
End: 2025-02-03
Payer: MEDICARE

## 2025-02-03 VITALS
HEIGHT: 59 IN | SYSTOLIC BLOOD PRESSURE: 146 MMHG | HEART RATE: 71 BPM | WEIGHT: 136.4 LBS | OXYGEN SATURATION: 98 % | BODY MASS INDEX: 27.5 KG/M2 | TEMPERATURE: 97.2 F | DIASTOLIC BLOOD PRESSURE: 86 MMHG | RESPIRATION RATE: 17 BRPM

## 2025-02-03 DIAGNOSIS — H61.22 IMPACTED CERUMEN OF LEFT EAR: Primary | ICD-10-CM

## 2025-02-03 PROCEDURE — G8753 SYS BP > OR = 140: HCPCS | Performed by: NURSE PRACTITIONER

## 2025-02-03 PROCEDURE — G8754 DIAS BP LESS 90: HCPCS | Performed by: NURSE PRACTITIONER

## 2025-02-03 PROCEDURE — 99213 OFFICE O/P EST LOW 20 MIN: CPT | Performed by: NURSE PRACTITIONER

## 2025-02-03 NOTE — ASSESSMENT & PLAN NOTE
Still unable to remove wax with irrigation. Wax plug appears larger than ear canal. Referred to ENT and got patient an appointment for today.

## 2025-02-03 NOTE — PROGRESS NOTES
Family Medicine  [facility] Internal Medicine in [facility]         The following have been reviewed and updated as appropriate in this visit:    Subjective      Patient ID: Milagros Hoffman is a 69 y.o. female.    This patient presents to have wax removed from her left ear. She was seen in the office last week and attempts to remove wax was unsuccessful. She has been using debrox drops twice daily since that appointment. She feels her hearing has improved a little since Friday.         Past Medical History:   Diagnosis Date    HTN (hypertension)     Obstructive uropathy 01/23/2023       No past surgical history on file.    Current Outpatient Medications   Medication Sig Dispense Refill    atorvastatin (LIPITOR) 10 mg tablet Take 1 tablet (10 mg total) by mouth daily. 90 tablet 1    ergocalciferol (VITAMIN D2) 1.25 mg (50,000 units) capsule Take 1 capsule (1.25 mg total) by mouth once a week. 12 capsule 0    erythromycin (ILOTYCIN) 5 mg/gram (0.5 %) ophthalmic ointment Apply to right eye 2 (two) times a day.      losartan-hydrochlorothiazide (HYZAAR) 100-12.5 mg per tablet Take 1 tablet by mouth daily. 90 tablet 1     No current facility-administered medications for this visit.       Allergies   Allergen Reactions    Doxycycline Rash     Red blotching on skin        Social History     Socioeconomic History    Marital status:      Spouse name: Not on file    Number of children: Not on file    Years of education: Not on file    Highest education level: Not on file   Occupational History    Not on file   Tobacco Use    Smoking status: Never    Smokeless tobacco: Never   Substance and Sexual Activity    Alcohol use: Never    Drug use: Never    Sexual activity: Not on file   Other Topics Concern    Not on file   Social History Narrative    Not on file     Social Drivers of Health     Financial Resource Strain: Not on file   Food Insecurity: No Food Insecurity (1/23/2023)    Hunger Vital Sign     Worried About  "Running Out of Food in the Last Year: Never true     Ran Out of Food in the Last Year: Never true   Transportation Needs: Not on file   Physical Activity: Not on file   Stress: Not on file   Social Connections: Not on file   Intimate Partner Violence: Not on file   Housing Stability: Not on file       No family history on file.      The following have been reviewed and updated as appropriate in this visit:   Allergies  Meds  Problems         Review of Systems  Per HPI      Objective     Visit Vitals  BP (!) 146/86 (BP Location: Right upper arm, Patient Position: Sitting)   Pulse 71   Temp 36.2 °C (97.2 °F) (Temporal)   Resp 17   Ht 1.499 m (4' 11\")   Wt 61.9 kg (136 lb 6.4 oz)   SpO2 98%   BMI 27.55 kg/m²       No results found.    Physical Exam  Constitutional:       General: She is not in acute distress.  HENT:      Left Ear: External ear normal. There is impacted cerumen.   Eyes:      General: No scleral icterus.        Right eye: No discharge.         Left eye: No discharge.   Cardiovascular:      Rate and Rhythm: Normal rate and regular rhythm.   Pulmonary:      Effort: Pulmonary effort is normal. No respiratory distress.   Musculoskeletal:         General: No swelling.      Cervical back: Normal range of motion and neck supple.      Right lower leg: No edema.      Left lower leg: No edema.   Lymphadenopathy:      Cervical: No cervical adenopathy.   Skin:     General: Skin is warm and dry.      Findings: No rash.   Neurological:      Mental Status: She is alert and oriented to person, place, and time.   Psychiatric:         Mood and Affect: Mood normal.           Assessment/Plan   Problem List Items Addressed This Visit          Ears/Nose/Throat    Impacted cerumen of left ear - Primary     Still unable to remove wax with irrigation. Wax plug appears larger than ear canal. Referred to ENT and got patient an appointment for today.            Return for Next scheduled follow-up.       Written education and " action steps suggested to the patient are documented in After Visit Summary / Patient Instructions sections of this encounter.        SHAY Grey

## 2025-03-10 DIAGNOSIS — E78.5 HYPERLIPIDEMIA, UNSPECIFIED HYPERLIPIDEMIA TYPE: ICD-10-CM

## 2025-03-10 RX ORDER — ATORVASTATIN CALCIUM 10 MG/1
10 TABLET, FILM COATED ORAL DAILY
Qty: 90 TABLET | Refills: 1 | Status: SHIPPED | OUTPATIENT
Start: 2025-03-10 | End: 2025-09-06

## 2025-03-10 NOTE — TELEPHONE ENCOUNTER
Medicine Refill Request    Last Office: 2/3/2025   Last Consult Visit: 1/29/2025  Last Telemedicine Visit: Visit date not found    Next Appointment: 5/29/2025

## 2025-03-12 ENCOUNTER — TELEPHONE (OUTPATIENT)
Dept: INTERNAL MEDICINE | Facility: CLINIC | Age: 70
End: 2025-03-12

## 2025-03-12 ENCOUNTER — OFFICE VISIT (OUTPATIENT)
Dept: INTERNAL MEDICINE | Facility: CLINIC | Age: 70
End: 2025-03-12
Payer: MEDICARE

## 2025-03-12 VITALS
OXYGEN SATURATION: 99 % | HEART RATE: 98 BPM | HEIGHT: 59 IN | WEIGHT: 138.2 LBS | RESPIRATION RATE: 18 BRPM | BODY MASS INDEX: 27.86 KG/M2 | SYSTOLIC BLOOD PRESSURE: 140 MMHG | DIASTOLIC BLOOD PRESSURE: 70 MMHG | TEMPERATURE: 98.1 F

## 2025-03-12 DIAGNOSIS — L30.9 DERMATITIS: Primary | ICD-10-CM

## 2025-03-12 PROCEDURE — G8753 SYS BP > OR = 140: HCPCS | Performed by: NURSE PRACTITIONER

## 2025-03-12 PROCEDURE — G8754 DIAS BP LESS 90: HCPCS | Performed by: NURSE PRACTITIONER

## 2025-03-12 PROCEDURE — 99213 OFFICE O/P EST LOW 20 MIN: CPT | Performed by: NURSE PRACTITIONER

## 2025-03-12 RX ORDER — PREDNISONE 20 MG/1
40 TABLET ORAL DAILY
Qty: 10 TABLET | Refills: 0 | Status: SHIPPED | OUTPATIENT
Start: 2025-03-12 | End: 2025-05-30 | Stop reason: ALTCHOICE

## 2025-03-12 NOTE — ASSESSMENT & PLAN NOTE
Likely related to nerves given when this started. Prednisone 40 mg daily for 5 days. Claritin daily. Follow up if not improving.

## 2025-03-12 NOTE — PROGRESS NOTES
Family Medicine  [facility] Internal Medicine in [facility]         The following have been reviewed and updated as appropriate in this visit:    Subjective      Patient ID: Milagros Hoffman is a 69 y.o. female.    This patient presents for evaluation of a rash on her face and neck. She had surgery on her eye on 2/25/2025. When she left home to go to the surgery center, she was feeling itchy on her face. By the time she got to the office she had hives all over her face. The surgeon said it was likely a nerve rash and proceeded with the surgery. The surgery went well and the rash got a little better. The follow week her face felt like it was on fire. She went to the pharmacy and got claritin. Last Tuesday she saw the surgeon and they gave her a z-bev. She finished that Monday and the rash was improving. Her face started almost peeling and her lips were peeling. Her face and lips got better. Yesterday she woke up and it felt like she was being strangled and was very itchy. She usually wears a necklace, but took the necklace off. She put calamine on her face last night. The rash seems to be spreading down her chest. She is going away next Thursday and is feeling a little nervous about that trip. She feels this rash is all nerves. Her face and neck are hot and itchy. Her face was starting to be less red, but now its more red again. She doesn't have the rash anywhere else and it's not even on the back of her neck. She only took the claritin once and felt it may have helped some. She denies any swelling in her throat. She had no issues with swallowing or speaking. The surgeon put something on her face after the surgery but he didn't think it was related to this rash since the rash started prior to her surgery. Her skin got dry after that. She has put some lotion on her face at times but nothing that is new. She denies any new products.         Past Medical History:   Diagnosis Date    HTN (hypertension)     Obstructive  uropathy 01/23/2023       Past Surgical History   Procedure Laterality Date    Eye surgery Right 02/25/2025       Current Outpatient Medications   Medication Sig Dispense Refill    atorvastatin (LIPITOR) 10 mg tablet Take 1 tablet (10 mg total) by mouth daily. 90 tablet 1    losartan-hydrochlorothiazide (HYZAAR) 100-12.5 mg per tablet Take 1 tablet by mouth daily. 90 tablet 1    predniSONE (DELTASONE) 20 mg tablet Take 2 tablets (40 mg total) by mouth daily for 5 days. Take 2 tablets daily for 4 days, then 1 tablet daily for 4 days, then 1/2 tablet daily for 4 days. 10 tablet 0     No current facility-administered medications for this visit.       Allergies   Allergen Reactions    Doxycycline Rash     Red blotching on skin        Social History     Socioeconomic History    Marital status:      Spouse name: Not on file    Number of children: Not on file    Years of education: Not on file    Highest education level: Not on file   Occupational History    Not on file   Tobacco Use    Smoking status: Never    Smokeless tobacco: Never   Substance and Sexual Activity    Alcohol use: Never    Drug use: Never    Sexual activity: Not on file   Other Topics Concern    Not on file   Social History Narrative    Not on file     Social Drivers of Health     Financial Resource Strain: Not on file   Food Insecurity: No Food Insecurity (1/23/2023)    Hunger Vital Sign     Worried About Running Out of Food in the Last Year: Never true     Ran Out of Food in the Last Year: Never true   Transportation Needs: Not on file   Physical Activity: Not on file   Stress: Not on file   Social Connections: Not on file   Intimate Partner Violence: Not on file   Housing Stability: Not on file       No family history on file.      The following have been reviewed and updated as appropriate in this visit:   Allergies  Meds  Problems         Review of Systems  Per HPI      Objective     Visit Vitals  BP (!) 140/70 (BP Location: Right upper  "arm, Patient Position: Sitting)   Pulse 98   Temp 36.7 °C (98.1 °F) (Temporal)   Resp 18   Ht 1.499 m (4' 11\")   Wt 62.7 kg (138 lb 3.2 oz)   SpO2 99%   BMI 27.91 kg/m²       No results found.    Physical Exam  Constitutional:       General: She is not in acute distress.  Eyes:      General: No scleral icterus.        Right eye: No discharge.         Left eye: No discharge.   Cardiovascular:      Rate and Rhythm: Normal rate and regular rhythm.   Pulmonary:      Effort: No respiratory distress.   Musculoskeletal:         General: No swelling.      Cervical back: Normal range of motion and neck supple.      Right lower leg: No edema.      Left lower leg: No edema.   Lymphadenopathy:      Cervical: No cervical adenopathy.   Skin:     General: Skin is warm and dry.      Findings: Rash present.             Comments: Erythematous flat rash on race, anterior neck, and chest. Edges of rash on chest with papules but patient reports she has been scratching in that area.    Neurological:      Mental Status: She is alert and oriented to person, place, and time.   Psychiatric:         Mood and Affect: Mood normal.         Assessment/Plan   Problem List Items Addressed This Visit          Infectious/Inflammatory    Dermatitis - Primary     Likely related to nerves given when this started. Prednisone 40 mg daily for 5 days. Claritin daily. Follow up if not improving.         Relevant Medications    predniSONE (DELTASONE) 20 mg tablet       Return if symptoms worsen or fail to improve.       Written education and action steps suggested to the patient are documented in After Visit Summary / Patient Instructions sections of this encounter.        SHAY Grey  "

## 2025-03-12 NOTE — TELEPHONE ENCOUNTER
RN spoke with pharmacy prior to this call and clarified the prescription - 40 mg prednisone daily for 5 days.

## 2025-04-19 ENCOUNTER — HOSPITAL ENCOUNTER (OUTPATIENT)
Facility: CLINIC | Age: 70
Discharge: HOME | End: 2025-04-19
Attending: FAMILY MEDICINE
Payer: MEDICARE

## 2025-04-19 VITALS
SYSTOLIC BLOOD PRESSURE: 154 MMHG | OXYGEN SATURATION: 99 % | TEMPERATURE: 98 F | DIASTOLIC BLOOD PRESSURE: 85 MMHG | HEART RATE: 85 BPM
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
## 2025-04-19 DIAGNOSIS — S00.96XA INSECT BITE OF HEAD, UNSPECIFIED PART, INITIAL ENCOUNTER: Primary | ICD-10-CM

## 2025-04-19 PROCEDURE — 99213 OFFICE O/P EST LOW 20 MIN: CPT

## 2025-04-19 RX ORDER — CEPHALEXIN 500 MG/1
500 CAPSULE ORAL 4 TIMES DAILY
Qty: 28 CAPSULE | Refills: 0 | Status: SHIPPED | OUTPATIENT
Start: 2025-04-19 | End: 2025-04-26

## 2025-04-19 NOTE — DISCHARGE INSTRUCTIONS
Use the antibiotic as prescribed  Try cool compresses, calamine lotion or over the counter cortisone for itch  Follow up with your PCP for worsening symptoms or if you want the area looked at again

## 2025-04-19 NOTE — ED PROVIDER NOTES
Emergency Medicine Note  HPI   HISTORY OF PRESENT ILLNESS     69 y.o. female presents for insect bite to her right lower jaw. States she was gardening yesterday and felt something bite her.   States the area is red, swollen, and tender today. Sami fever, chills, body aches. She put some neosporin ointment on it before coming in.  She is concerned it was a spider. States she did not see the insect that bit her. Has a history of cellulitis secondary to insect bites.          Patient History   PAST HISTORY     Reviewed from Nursing Triage:       Past Medical History:   Diagnosis Date    HTN (hypertension)     Obstructive uropathy 01/23/2023       Past Surgical History   Procedure Laterality Date    Eye surgery Right 02/25/2025       No family history on file.    Social History     Tobacco Use    Smoking status: Never    Smokeless tobacco: Never   Substance Use Topics    Alcohol use: Never    Drug use: Never         Review of Systems   REVIEW OF SYSTEMS     Review of Systems      VITALS     ED Vitals      Date/Time Temp Pulse Resp BP SpO2 [facility]   04/19/25 0959 36.7 °C (98 °F) 85 -- 154/85 99 % SMO                         Physical Exam   PHYSICAL EXAM     Physical Exam  Vitals reviewed.   Constitutional:       Appearance: Normal appearance.   HENT:      Head: Normocephalic.      Nose: Nose normal.      Mouth/Throat:      Mouth: Mucous membranes are moist.   Cardiovascular:      Rate and Rhythm: Normal rate and regular rhythm.   Pulmonary:      Effort: Pulmonary effort is normal.   Skin:     General: Skin is warm and dry.      Capillary Refill: Capillary refill takes less than 2 seconds.      Findings: Erythema present.      Comments: Erythema, tenderness, swelling surrounding insect  bite to right lower jaw. No active drainage, mild tenderness to palpation.    Neurological:      Mental Status: She is alert and oriented to person, place, and time.   Psychiatric:         Behavior: Behavior normal.           PROCEDURES      Procedures     DATA     Results       None                No orders to display       Scoring tools                                  ED Course & MDM   MDM / ED COURSE / CLINICAL IMPRESSION / DISPO     Medical Decision Making  Patient expressed concern for cellulitis given bite location and history of cellulitis from insect bite on her arm  Start keflex as prescribed. Reviewed SE profile  Recommend cool compresses, tylenol as needed for discomfort  Follow up for new/worsening symptoms  Patient verbalized understanding, in agreement with plan. All questions answered.             Clinical Impression      Insect bite of head, unspecified part, initial encounter     _________________       ED Disposition   Discharge                       Katelin Quinones CRNP  04/19/25 1040

## 2025-04-19 NOTE — ED ATTESTATION NOTE
I was immediately available to provide supervision and direction for the care of the patient.     Crystal Moreau,   04/19/25 1100

## 2025-04-25 PROCEDURE — 99490 CHRNC CARE MGMT STAFF 1ST 20: CPT

## 2025-05-30 ENCOUNTER — APPOINTMENT (OUTPATIENT)
Dept: LAB | Age: 70
End: 2025-05-30
Attending: NURSE PRACTITIONER
Payer: MEDICARE

## 2025-05-30 ENCOUNTER — OFFICE VISIT (OUTPATIENT)
Dept: INTERNAL MEDICINE | Facility: CLINIC | Age: 70
End: 2025-05-30
Payer: MEDICARE

## 2025-05-30 VITALS
SYSTOLIC BLOOD PRESSURE: 116 MMHG | TEMPERATURE: 97.7 F | HEIGHT: 59 IN | BODY MASS INDEX: 27.78 KG/M2 | HEART RATE: 73 BPM | WEIGHT: 137.8 LBS | RESPIRATION RATE: 18 BRPM | OXYGEN SATURATION: 99 % | DIASTOLIC BLOOD PRESSURE: 80 MMHG

## 2025-05-30 DIAGNOSIS — I10 PRIMARY HYPERTENSION: ICD-10-CM

## 2025-05-30 DIAGNOSIS — Z11.59 ENCOUNTER FOR HEPATITIS C SCREENING TEST FOR LOW RISK PATIENT: ICD-10-CM

## 2025-05-30 DIAGNOSIS — R73.09 ELEVATED GLUCOSE: ICD-10-CM

## 2025-05-30 DIAGNOSIS — E55.9 VITAMIN D DEFICIENCY: ICD-10-CM

## 2025-05-30 DIAGNOSIS — E78.5 HYPERLIPIDEMIA, UNSPECIFIED HYPERLIPIDEMIA TYPE: ICD-10-CM

## 2025-05-30 DIAGNOSIS — Z00.00 MEDICARE ANNUAL WELLNESS VISIT, INITIAL: Primary | ICD-10-CM

## 2025-05-30 PROBLEM — Z76.89 ENCOUNTER TO ESTABLISH CARE: Status: RESOLVED | Noted: 2024-11-25 | Resolved: 2025-05-30

## 2025-05-30 LAB
25(OH)D3 SERPL-MCNC: 34 NG/ML (ref 30–100)
ALBUMIN SERPL-MCNC: 4.4 G/DL (ref 3.5–5.7)
ALP SERPL-CCNC: 67 IU/L (ref 34–125)
ALT SERPL-CCNC: 18 IU/L (ref 7–52)
ANION GAP SERPL CALC-SCNC: 9 MEQ/L (ref 3–15)
AST SERPL-CCNC: 17 IU/L (ref 13–39)
BASOPHILS # BLD: 0.06 K/UL (ref 0.01–0.1)
BASOPHILS NFR BLD: 0.6 %
BILIRUB SERPL-MCNC: 0.5 MG/DL (ref 0.3–1.2)
BUN SERPL-MCNC: 25 MG/DL (ref 7–25)
CALCIUM SERPL-MCNC: 9.7 MG/DL (ref 8.6–10.3)
CHLORIDE SERPL-SCNC: 105 MEQ/L (ref 98–107)
CHOLEST SERPL-MCNC: 126 MG/DL
CO2 SERPL-SCNC: 29 MEQ/L (ref 21–31)
CREAT SERPL-MCNC: 0.6 MG/DL (ref 0.6–1.2)
DIFFERENTIAL METHOD BLD: ABNORMAL
EGFRCR SERPLBLD CKD-EPI 2021: >60 ML/MIN/1.73M*2
EOSINOPHIL # BLD: 0.35 K/UL (ref 0.04–0.36)
EOSINOPHIL NFR BLD: 3.6 %
ERYTHROCYTE [DISTWIDTH] IN BLOOD BY AUTOMATED COUNT: 13.2 % (ref 11.7–14.4)
EST. AVERAGE GLUCOSE BLD GHB EST-MCNC: 105 MG/DL
GLUCOSE SERPL-MCNC: 91 MG/DL (ref 70–99)
HBA1C MFR BLD: 5.3 %
HCT VFR BLD AUTO: 41.6 % (ref 35–45)
HCV AB SER QL: NONREACTIVE
HDLC SERPL-MCNC: 43 MG/DL
HDLC SERPL: 2.9 {RATIO}
HGB BLD-MCNC: 13.3 G/DL (ref 11.8–15.7)
IMM GRANULOCYTES # BLD AUTO: 0.04 K/UL (ref 0–0.08)
IMM GRANULOCYTES NFR BLD AUTO: 0.4 %
LDLC SERPL CALC-MCNC: 64 MG/DL
LYMPHOCYTES # BLD: 2.67 K/UL (ref 1.2–3.5)
LYMPHOCYTES NFR BLD: 27.1 %
MCH RBC QN AUTO: 30.2 PG (ref 28–33.2)
MCHC RBC AUTO-ENTMCNC: 32 G/DL (ref 32.2–35.5)
MCV RBC AUTO: 94.3 FL (ref 83–98)
MONOCYTES # BLD: 0.62 K/UL (ref 0.28–0.8)
MONOCYTES NFR BLD: 6.3 %
NEUTROPHILS # BLD: 6.11 K/UL (ref 1.7–7)
NEUTS SEG NFR BLD: 62 %
NONHDLC SERPL-MCNC: 83 MG/DL
NRBC BLD-RTO: 0 %
PLATELET # BLD AUTO: 280 K/UL (ref 150–369)
PMV BLD AUTO: 9.6 FL (ref 9.4–12.3)
POTASSIUM SERPL-SCNC: 4.2 MEQ/L (ref 3.5–5.1)
PROT SERPL-MCNC: 6.7 G/DL (ref 6–8.2)
RBC # BLD AUTO: 4.41 M/UL (ref 3.93–5.22)
SODIUM SERPL-SCNC: 143 MEQ/L (ref 136–145)
TRIGL SERPL-MCNC: 97 MG/DL
TSH SERPL DL<=0.05 MIU/L-ACNC: 1.5 MIU/L (ref 0.34–5.6)
WBC # BLD AUTO: 9.85 K/UL (ref 3.8–10.5)

## 2025-05-30 PROCEDURE — 80053 COMPREHEN METABOLIC PANEL: CPT

## 2025-05-30 PROCEDURE — 80061 LIPID PANEL: CPT

## 2025-05-30 PROCEDURE — 82306 VITAMIN D 25 HYDROXY: CPT

## 2025-05-30 PROCEDURE — 36415 COLL VENOUS BLD VENIPUNCTURE: CPT

## 2025-05-30 PROCEDURE — 84443 ASSAY THYROID STIM HORMONE: CPT

## 2025-05-30 PROCEDURE — 86803 HEPATITIS C AB TEST: CPT

## 2025-05-30 PROCEDURE — 85025 COMPLETE CBC W/AUTO DIFF WBC: CPT

## 2025-05-30 PROCEDURE — G0438 PPPS, INITIAL VISIT: HCPCS | Performed by: NURSE PRACTITIONER

## 2025-05-30 PROCEDURE — 83036 HEMOGLOBIN GLYCOSYLATED A1C: CPT

## 2025-06-02 ENCOUNTER — RESULTS FOLLOW-UP (OUTPATIENT)
Dept: INTERNAL MEDICINE | Facility: CLINIC | Age: 70
End: 2025-06-02

## 2025-06-02 NOTE — RESULT ENCOUNTER NOTE
Call patient. Let her know that her metabolic panel, vitamin D level, thyroid function, and blood counts are stable. Her cholesterol levels are at goal. Her fasting blood sugar was 91 and her hemoglobin A1c was 5.3 which shows good blood sugar control. Continue current treatment plan and follow up as scheduled or sooner if needed.

## 2025-07-29 ENCOUNTER — HOSPITAL ENCOUNTER (OUTPATIENT)
Dept: RADIOLOGY | Age: 70
Discharge: HOME | End: 2025-07-29
Attending: STUDENT IN AN ORGANIZED HEALTH CARE EDUCATION/TRAINING PROGRAM
Payer: MEDICARE

## 2025-07-29 DIAGNOSIS — N20.0 KIDNEY CALCULUS: ICD-10-CM

## 2025-07-29 PROCEDURE — 74018 RADEX ABDOMEN 1 VIEW: CPT

## 2025-07-29 PROCEDURE — 76775 US EXAM ABDO BACK WALL LIM: CPT

## 2025-08-01 DIAGNOSIS — I10 PRIMARY HYPERTENSION: ICD-10-CM

## 2025-08-01 RX ORDER — LOSARTAN POTASSIUM AND HYDROCHLOROTHIAZIDE 12.5; 1 MG/1; MG/1
1 TABLET ORAL DAILY
Qty: 90 TABLET | Refills: 1 | Status: SHIPPED | OUTPATIENT
Start: 2025-08-01

## 2025-08-01 NOTE — TELEPHONE ENCOUNTER
Medicine Refill Request    Last Office: 5/30/2025   Last Consult Visit: 1/29/2025  Last Telemedicine Visit: Visit date not found    Next Appointment: 12/1/2025

## 2025-09-03 DIAGNOSIS — E78.5 HYPERLIPIDEMIA, UNSPECIFIED HYPERLIPIDEMIA TYPE: ICD-10-CM

## 2025-09-03 RX ORDER — ATORVASTATIN CALCIUM 10 MG/1
10 TABLET, FILM COATED ORAL DAILY
Qty: 90 TABLET | Refills: 1 | Status: SHIPPED | OUTPATIENT
Start: 2025-09-03